# Patient Record
Sex: FEMALE | Race: WHITE | NOT HISPANIC OR LATINO | Employment: OTHER | URBAN - METROPOLITAN AREA
[De-identification: names, ages, dates, MRNs, and addresses within clinical notes are randomized per-mention and may not be internally consistent; named-entity substitution may affect disease eponyms.]

---

## 2021-10-11 ENCOUNTER — OFFICE VISIT (OUTPATIENT)
Dept: NEUROSURGERY | Facility: CLINIC | Age: 66
End: 2021-10-11

## 2021-10-11 VITALS
SYSTOLIC BLOOD PRESSURE: 118 MMHG | HEIGHT: 65 IN | WEIGHT: 174.2 LBS | TEMPERATURE: 96.8 F | BODY MASS INDEX: 29.02 KG/M2 | DIASTOLIC BLOOD PRESSURE: 50 MMHG

## 2021-10-11 DIAGNOSIS — M54.50 CHRONIC BILATERAL LOW BACK PAIN, UNSPECIFIED WHETHER SCIATICA PRESENT: ICD-10-CM

## 2021-10-11 DIAGNOSIS — M19.90 ARTHRITIS: ICD-10-CM

## 2021-10-11 DIAGNOSIS — M25.552 BILATERAL HIP PAIN: ICD-10-CM

## 2021-10-11 DIAGNOSIS — M51.36 DDD (DEGENERATIVE DISC DISEASE), LUMBAR: Primary | ICD-10-CM

## 2021-10-11 DIAGNOSIS — G89.29 CHRONIC BILATERAL LOW BACK PAIN, UNSPECIFIED WHETHER SCIATICA PRESENT: ICD-10-CM

## 2021-10-11 DIAGNOSIS — M25.551 BILATERAL HIP PAIN: ICD-10-CM

## 2021-10-11 PROCEDURE — 99203 OFFICE O/P NEW LOW 30 MIN: CPT | Performed by: PHYSICIAN ASSISTANT

## 2021-10-11 RX ORDER — IRBESARTAN AND HYDROCHLOROTHIAZIDE 300; 12.5 MG/1; MG/1
TABLET, FILM COATED ORAL
COMMUNITY
Start: 2021-10-08 | End: 2023-03-14

## 2021-10-11 RX ORDER — NIFEDIPINE 60 MG/1
TABLET, FILM COATED, EXTENDED RELEASE ORAL
COMMUNITY
Start: 2021-08-13 | End: 2023-03-14

## 2021-10-11 RX ORDER — BLOOD SUGAR DIAGNOSTIC
STRIP MISCELLANEOUS
COMMUNITY
Start: 2021-08-06

## 2021-10-11 RX ORDER — ALPRAZOLAM 0.5 MG/1
TABLET ORAL
COMMUNITY
Start: 2021-10-09 | End: 2023-03-14

## 2021-10-11 RX ORDER — OMEPRAZOLE 20 MG/1
20 CAPSULE, DELAYED RELEASE ORAL DAILY
COMMUNITY
End: 2023-03-14

## 2021-10-11 RX ORDER — TIZANIDINE 4 MG/1
TABLET ORAL
COMMUNITY
Start: 2021-10-07 | End: 2023-03-14

## 2021-10-11 RX ORDER — BISOPROLOL FUMARATE 10 MG/1
TABLET, FILM COATED ORAL
COMMUNITY
Start: 2021-10-07

## 2021-10-11 RX ORDER — OXYCODONE AND ACETAMINOPHEN 10; 325 MG/1; MG/1
TABLET ORAL
COMMUNITY
Start: 2021-09-18

## 2021-10-11 RX ORDER — GABAPENTIN 400 MG/1
CAPSULE ORAL
COMMUNITY
Start: 2021-09-18 | End: 2023-03-14

## 2021-10-11 RX ORDER — CHOLECALCIFEROL (VITAMIN D3) 125 MCG
CAPSULE ORAL
COMMUNITY

## 2021-10-11 RX ORDER — ALBUTEROL SULFATE 90 UG/1
AEROSOL, METERED RESPIRATORY (INHALATION)
COMMUNITY
Start: 2021-08-15

## 2021-10-11 RX ORDER — LAMOTRIGINE 100 MG/1
TABLET ORAL
COMMUNITY
Start: 2021-09-09

## 2021-10-11 RX ORDER — ATORVASTATIN CALCIUM 80 MG/1
80 TABLET, FILM COATED ORAL DAILY
COMMUNITY

## 2021-10-11 RX ORDER — GLIMEPIRIDE 4 MG/1
TABLET ORAL
COMMUNITY
Start: 2021-08-28

## 2021-10-11 RX ORDER — ESCITALOPRAM OXALATE 20 MG/1
20 TABLET ORAL DAILY
COMMUNITY

## 2021-10-11 NOTE — PROGRESS NOTES
Patient: Vibha Mcdowell  : 1955  Chart #: [id number]    Date of Service: 10/11/2021    CC:back pain, bilateral hip pain, bilateral leg pain    HPI  This is a 66-year-old female with comorbidities of tobacco abuse, 1 pack/day, diabetes mellitus, hypertension, thyroid disease, coronary artery disease, poorly controlled depression, asthma and chronic pain.  The patient is a patient of Dr. Leon, she had herniated disc at L5-S1 on the left on 2 separate surgeries and then had recurrence and underwent a lumbar fusion at L5-S1 on 2012.  The patient has had chronic pain in her back and some into both legs, she presents now stating that her pain in her back, hips and legs have gotten so severe she cannot do any housework, she is very limited on her activities throughout the day and she is depending upon her  to take care of her in the house.  Her pain is in the lower back radiating into the bilateral hips, down the back of the thighs into the calf and down into the side of each foot into the fourth and fifth digits.  Her pain is worse with sitting.  She denies bladder dysfunction.  She presents without new studies.    Chronic Illness:  Osteoarthritis  Chronic back pain  Nicotine dependence  Past Medical History:   Diagnosis Date   • Anemia    • Arthritis    • Asthma    • Bronchitis    • Diabetes mellitus (HCC)    • Headache    • Heart attack (HCC)    • Heart disease    • History of transfusion    • Hyperlipidemia    • Hypertension    • Low back pain    • Pneumonia    • Thyroid disease        Allergies   Allergen Reactions   • Coreg [Carvedilol] Shortness Of Breath   • Augmentin [Amoxicillin-Pot Clavulanate] Diarrhea   • Morphine Nausea And Vomiting   • Tequin [Gatifloxacin] Delirium         Current Outpatient Medications:   •  albuterol sulfate  (90 Base) MCG/ACT inhaler, , Disp: , Rfl:   •  ALPRAZolam (XANAX) 0.5 MG tablet, , Disp: , Rfl:   •  atorvastatin (LIPITOR) 80 MG tablet, Take 80  mg by mouth Daily., Disp: , Rfl:   •  bisoprolol (ZEBeta) 10 MG tablet, , Disp: , Rfl:   •  Cholecalciferol (Vitamin D3) 50 MCG (2000 UT) tablet, Take  by mouth., Disp: , Rfl:   •  escitalopram (LEXAPRO) 20 MG tablet, Take 20 mg by mouth Daily., Disp: , Rfl:   •  gabapentin (NEURONTIN) 400 MG capsule, , Disp: , Rfl:   •  glimepiride (AMARYL) 4 MG tablet, , Disp: , Rfl:   •  irbesartan-hydrochlorothiazide (AVALIDE) 300-12.5 MG tablet, , Disp: , Rfl:   •  lamoTRIgine (LaMICtal) 100 MG tablet, , Disp: , Rfl:   •  metFORMIN (GLUCOPHAGE) 1000 MG tablet, Take 1,000 mg by mouth., Disp: , Rfl:   •  NIFEdipine CC (ADALAT CC) 60 MG 24 hr tablet, , Disp: , Rfl:   •  omeprazole (priLOSEC) 20 MG capsule, Take 20 mg by mouth Daily., Disp: , Rfl:   •  OneTouch Verio test strip, , Disp: , Rfl:   •  oxyCODONE-acetaminophen (PERCOCET)  MG per tablet, , Disp: , Rfl:   •  tiZANidine (ZANAFLEX) 4 MG tablet, , Disp: , Rfl:     Social History     Socioeconomic History   • Marital status:    Tobacco Use   • Smoking status: Current Every Day Smoker     Packs/day: 1.00   • Smokeless tobacco: Never Used   Substance and Sexual Activity   • Alcohol use: Never   • Drug use: Never   • Sexual activity: Defer       Family History   Problem Relation Age of Onset   • Arthritis Mother    • Cancer Mother    • Heart disease Mother    • Hypertension Mother    • Arthritis Father    • Diabetes Sister    • Hypertension Sister        Review of Systems   Constitutional: Positive for activity change and fatigue. Negative for appetite change, chills, diaphoresis, fever and unexpected weight change.   HENT: Positive for congestion, dental problem, drooling, ear discharge, ear pain, postnasal drip, sinus pressure and trouble swallowing. Negative for facial swelling, hearing loss, mouth sores, nosebleeds, rhinorrhea, sinus pain, sneezing, sore throat, tinnitus and voice change.    Eyes: Positive for itching. Negative for photophobia, pain, discharge,  redness and visual disturbance.   Respiratory: Positive for wheezing. Negative for apnea, cough, choking, chest tightness, shortness of breath and stridor.    Cardiovascular: Positive for leg swelling. Negative for chest pain and palpitations.   Gastrointestinal: Positive for diarrhea. Negative for abdominal distention, abdominal pain, anal bleeding, blood in stool, constipation, nausea, rectal pain and vomiting.   Endocrine: Positive for cold intolerance and heat intolerance. Negative for polydipsia, polyphagia and polyuria.   Genitourinary: Negative for decreased urine volume, difficulty urinating, dysuria, enuresis, flank pain, frequency, genital sores, hematuria and urgency.   Musculoskeletal: Positive for back pain, neck pain and neck stiffness. Negative for arthralgias, gait problem, joint swelling and myalgias.   Skin: Negative for color change, pallor, rash and wound.   Allergic/Immunologic: Positive for environmental allergies. Negative for food allergies and immunocompromised state.   Neurological: Positive for headaches. Negative for dizziness, tremors, seizures, syncope, facial asymmetry, speech difficulty, weakness, light-headedness and numbness.   Hematological: Negative for adenopathy. Does not bruise/bleed easily.   Psychiatric/Behavioral: Positive for agitation, decreased concentration and sleep disturbance. Negative for behavioral problems, confusion, dysphoric mood, hallucinations, self-injury and suicidal ideas. The patient is not nervous/anxious and is not hyperactive.        Patient's Body mass index is 28.99 kg/m². indicating that she is overweight (BMI 25-29.9). Obesity-related health conditions include the following: osteoarthritis. Obesity is unchanged. BMI is is above average; BMI management plan is completed.      Social History    Tobacco Use      Smoking status: Current Every Day Smoker        Packs/day: 1.00      Smokeless tobacco: Never Used  [place] JANICE Mcdowell  reports that she has  "been smoking. She has been smoking about 1.00 pack per day. She has never used smokeless tobacco.. I have educated her on the risk of diseases from using tobacco products such as cancer, COPD, heart disease and osteoarthritis.   I advised her to quit and she is not willing to quit.  I spent 5 minutes counseling the patient.    Physical examination:  Blood pressure 118/50, temperature 96.8 °F (36 °C), height 165.1 cm (65\"), weight 79 kg (174 lb 3.2 oz).  HEENT- normocephalic, atraumatic, sclera clear  Lungs-normal expansion, no wheezing  Heart-regular rate and rhythm  Extremities- -edema     Neurologic Exam  [id number]  A/A/C, speech clear, attention normal, conversant, answers questions appropriately, good historian.  Cranial nerves II through XII are intact  Motor examination does not reveal weakness in the , upper or lower extremities.   Sensation is intact.  Gait is normal, balance is normal.   No tremors are noted.  Reflexes absent in LE's.  SLE causes back pain.   Palpation of the back is mildly tender.    Radiographic Imaging:  No images to review.    Medical Decision Making  Assessment and Plan:  1. Spinal stenosis  2. Osteoarthritis  3. Nicotine dependence-  I discussed smoking cessation with the patient. If she were to require surgery with fusion she must be nicotine free.    I have ordered the appropriate studies and she will return to see me.    Christy Coleman PA-C      Patient Care Team:  James Brooks MD as PCP - General (Internal Medicine)        "

## 2021-10-13 ENCOUNTER — PATIENT ROUNDING (BHMG ONLY) (OUTPATIENT)
Dept: NEUROSURGERY | Facility: CLINIC | Age: 66
End: 2021-10-13

## 2021-10-13 NOTE — PROGRESS NOTES
October 13, 2021    Hello, may I speak with Vibha Mcdowell?    My name is Renata    I am  with [facility]  [address]  [place] [address] [phone].    Before we get started may I verify your date of birth? 1955    I am calling to officially welcome you to our practice and ask about your recent visit. Is this a good time to talk? yes    Tell me about your visit with us. What things went well?  all went well       We're always looking for ways to make our patients' experiences even better. Do you have recommendations on ways we may improve? no    Overall were you satisfied with your first visit to our practice? yes       I appreciate you taking the time to speak with me today. Is there anything else I can do for you? No - I told Ms. Mcdowell if she any questions or concerns prior to her follow-up with Kristen Coleman PA-C on 11-11-21 to please call us.      Thank you, and have a great day.

## 2021-10-26 ENCOUNTER — TELEPHONE (OUTPATIENT)
Dept: NEUROSURGERY | Facility: CLINIC | Age: 66
End: 2021-10-26

## 2021-10-26 DIAGNOSIS — M19.90 ARTHRITIS: Primary | ICD-10-CM

## 2021-10-26 NOTE — TELEPHONE ENCOUNTER
[facility] called and needs lab orders for a Creatinine and BUN. Can someone please enter these orders to fax?    Fax # [phone] Attn: Marcela

## 2021-11-23 ENCOUNTER — OFFICE VISIT (OUTPATIENT)
Dept: NEUROSURGERY | Facility: CLINIC | Age: 66
End: 2021-11-23

## 2021-11-23 ENCOUNTER — HOSPITAL ENCOUNTER (OUTPATIENT)
Dept: GENERAL RADIOLOGY | Facility: HOSPITAL | Age: 66
Discharge: HOME | End: 2021-11-23
Admitting: PHYSICIAN ASSISTANT

## 2021-11-23 ENCOUNTER — TELEPHONE (OUTPATIENT)
Dept: NEUROSURGERY | Facility: CLINIC | Age: 66
End: 2021-11-23

## 2021-11-23 VITALS
SYSTOLIC BLOOD PRESSURE: 140 MMHG | HEIGHT: 65 IN | TEMPERATURE: 96.8 F | BODY MASS INDEX: 29.16 KG/M2 | DIASTOLIC BLOOD PRESSURE: 60 MMHG | WEIGHT: 175 LBS

## 2021-11-23 DIAGNOSIS — G89.29 CHRONIC BILATERAL LOW BACK PAIN WITH BILATERAL SCIATICA: ICD-10-CM

## 2021-11-23 DIAGNOSIS — G89.29 CHRONIC BILATERAL LOW BACK PAIN, UNSPECIFIED WHETHER SCIATICA PRESENT: Primary | ICD-10-CM

## 2021-11-23 DIAGNOSIS — M54.41 CHRONIC BILATERAL LOW BACK PAIN WITH BILATERAL SCIATICA: ICD-10-CM

## 2021-11-23 DIAGNOSIS — M25.551 BILATERAL HIP PAIN: ICD-10-CM

## 2021-11-23 DIAGNOSIS — M54.50 CHRONIC BILATERAL LOW BACK PAIN, UNSPECIFIED WHETHER SCIATICA PRESENT: ICD-10-CM

## 2021-11-23 DIAGNOSIS — M19.90 ARTHRITIS: Primary | ICD-10-CM

## 2021-11-23 DIAGNOSIS — M54.42 CHRONIC BILATERAL LOW BACK PAIN WITH BILATERAL SCIATICA: ICD-10-CM

## 2021-11-23 DIAGNOSIS — M25.552 BILATERAL HIP PAIN: ICD-10-CM

## 2021-11-23 DIAGNOSIS — G89.29 CHRONIC BILATERAL LOW BACK PAIN, UNSPECIFIED WHETHER SCIATICA PRESENT: ICD-10-CM

## 2021-11-23 DIAGNOSIS — M54.50 CHRONIC BILATERAL LOW BACK PAIN, UNSPECIFIED WHETHER SCIATICA PRESENT: Primary | ICD-10-CM

## 2021-11-23 DIAGNOSIS — M19.90 ARTHRITIS: ICD-10-CM

## 2021-11-23 PROCEDURE — 73522 X-RAY EXAM HIPS BI 3-4 VIEWS: CPT

## 2021-11-23 PROCEDURE — 72110 X-RAY EXAM L-2 SPINE 4/>VWS: CPT

## 2021-11-23 PROCEDURE — 99214 OFFICE O/P EST MOD 30 MIN: CPT | Performed by: PHYSICIAN ASSISTANT

## 2021-11-23 RX ORDER — BUDESONIDE AND FORMOTEROL FUMARATE DIHYDRATE 80; 4.5 UG/1; UG/1
AEROSOL RESPIRATORY (INHALATION)
COMMUNITY
End: 2021-11-23 | Stop reason: SDUPTHER

## 2021-11-23 RX ORDER — LAMOTRIGINE 100 MG/1
TABLET ORAL
COMMUNITY
End: 2021-11-23 | Stop reason: SDUPTHER

## 2021-11-23 RX ORDER — ESCITALOPRAM OXALATE 20 MG/1
TABLET ORAL EVERY 24 HOURS
COMMUNITY
End: 2021-11-23 | Stop reason: SDUPTHER

## 2021-11-23 RX ORDER — ALPRAZOLAM 0.5 MG/1
TABLET ORAL
COMMUNITY
Start: 2021-11-04

## 2021-11-23 RX ORDER — AMLODIPINE BESYLATE 10 MG/1
TABLET ORAL
COMMUNITY

## 2023-02-02 ENCOUNTER — TELEPHONE (OUTPATIENT)
Dept: NEUROSURGERY | Facility: CLINIC | Age: 68
End: 2023-02-02
Payer: MEDICARE

## 2023-02-02 DIAGNOSIS — G89.29 CHRONIC BILATERAL LOW BACK PAIN WITH BILATERAL SCIATICA: Primary | ICD-10-CM

## 2023-02-02 DIAGNOSIS — M54.42 CHRONIC BILATERAL LOW BACK PAIN WITH BILATERAL SCIATICA: Primary | ICD-10-CM

## 2023-02-02 DIAGNOSIS — M54.41 CHRONIC BILATERAL LOW BACK PAIN WITH BILATERAL SCIATICA: Primary | ICD-10-CM

## 2023-02-02 NOTE — TELEPHONE ENCOUNTER
Patient did not answer, left a VM. Unsure of what surgery that her  is referring to, patient hasn't been seen since November of 2021 and at that time the note indicated conservative treatment. She is also a smoker so a fusion would not be able to be done unless she stops smoking.

## 2023-02-02 NOTE — TELEPHONE ENCOUNTER
Provider:  Jared  Surgery/Procedure:   AMADOU  Surgery/Procedure Date:    Last visit:   11/23/21  Next visit: NA     Reason for call: Patient called in regards of a surgery auth that she was supposed to receive after her last appointment - she was last seen on 11/23/21 with Christy Coleman PA-C. There is no definite indication of surgery in the note, there is some mention that if she were to require lumbar fusion she would need to stop smoking, however patient reports she is still smoking a half a pack a day. The note did also indicate conservative treatment for now.    She is reporting worsening of symptoms, low back pain that is radiating down both of her legs to her feet, describes this as a sharp and burning pain. Is negatively affecting her activity level, she is unable to walk without pain, only finds relief when lying down. The pain is also affecting her balance, is requiring a walker now to ambulate - does not report weakness. She has not had any physical therapy, does see a pain clinic, had epidural injections before but it has been some time ago. Is taking gabapentin 400mg TID, and Percocet 10mg TID.     Last imaging on file is a lumbar MRI on 11/23/21.

## 2023-03-14 ENCOUNTER — HOSPITAL ENCOUNTER (OUTPATIENT)
Dept: MRI IMAGING | Facility: HOSPITAL | Age: 68
Discharge: HOME | End: 2023-03-14
Payer: MEDICARE

## 2023-03-14 ENCOUNTER — HOSPITAL ENCOUNTER (OUTPATIENT)
Dept: GENERAL RADIOLOGY | Facility: HOSPITAL | Age: 68
Discharge: HOME | End: 2023-03-14
Payer: MEDICARE

## 2023-03-14 ENCOUNTER — OFFICE VISIT (OUTPATIENT)
Dept: NEUROSURGERY | Facility: CLINIC | Age: 68
End: 2023-03-14
Payer: MEDICARE

## 2023-03-14 VITALS
HEIGHT: 65 IN | SYSTOLIC BLOOD PRESSURE: 160 MMHG | WEIGHT: 154 LBS | BODY MASS INDEX: 25.66 KG/M2 | DIASTOLIC BLOOD PRESSURE: 70 MMHG

## 2023-03-14 DIAGNOSIS — M54.41 CHRONIC BILATERAL LOW BACK PAIN WITH BILATERAL SCIATICA: Primary | ICD-10-CM

## 2023-03-14 DIAGNOSIS — M54.42 CHRONIC BILATERAL LOW BACK PAIN WITH BILATERAL SCIATICA: ICD-10-CM

## 2023-03-14 DIAGNOSIS — M54.41 CHRONIC BILATERAL LOW BACK PAIN WITH BILATERAL SCIATICA: ICD-10-CM

## 2023-03-14 DIAGNOSIS — G89.29 CHRONIC BILATERAL LOW BACK PAIN WITH BILATERAL SCIATICA: ICD-10-CM

## 2023-03-14 DIAGNOSIS — M51.36 DDD (DEGENERATIVE DISC DISEASE), LUMBAR: ICD-10-CM

## 2023-03-14 DIAGNOSIS — M54.42 CHRONIC BILATERAL LOW BACK PAIN WITH BILATERAL SCIATICA: Primary | ICD-10-CM

## 2023-03-14 DIAGNOSIS — G89.29 CHRONIC BILATERAL LOW BACK PAIN WITH BILATERAL SCIATICA: Primary | ICD-10-CM

## 2023-03-14 PROCEDURE — 72100 X-RAY EXAM L-S SPINE 2/3 VWS: CPT

## 2023-03-14 PROCEDURE — 1160F RVW MEDS BY RX/DR IN RCRD: CPT | Performed by: PHYSICIAN ASSISTANT

## 2023-03-14 PROCEDURE — 72148 MRI LUMBAR SPINE W/O DYE: CPT

## 2023-03-14 PROCEDURE — 99214 OFFICE O/P EST MOD 30 MIN: CPT | Performed by: PHYSICIAN ASSISTANT

## 2023-03-14 PROCEDURE — 1159F MED LIST DOCD IN RCRD: CPT | Performed by: PHYSICIAN ASSISTANT

## 2023-03-14 RX ORDER — NIFEDIPINE 30 MG/1
TABLET, EXTENDED RELEASE ORAL
COMMUNITY
Start: 2023-03-13

## 2023-03-14 RX ORDER — VALSARTAN AND HYDROCHLOROTHIAZIDE 320; 25 MG/1; MG/1
1 TABLET, FILM COATED ORAL DAILY
COMMUNITY
Start: 2022-09-24

## 2023-03-14 RX ORDER — OMEPRAZOLE 20 MG/1
20 CAPSULE, DELAYED RELEASE ORAL DAILY
COMMUNITY

## 2023-03-14 NOTE — PROGRESS NOTES
Vibha Mcdowell   1955   [id number]       03/14/2023     Chief Complaint   Patient presents with   • Back Pain       HPI   This is a 57-year-old female with a long history of low back pain.  She was last seen in the office in December 2021.  Since her last visit she has stopped smoking.  She presents now with increased low back pain pain radiating into both hips and down both legs to the calf with numbness into the lateral aspect of the left foot.  Her back and leg pain limits her activities, she reports she is not doing any housework due to pain.  She cannot walk distances without increased back and leg pain, she is not going to the grocery store any longer.  She is leading a very sedentary lifestyle.  The patient has comorbidities of diabetes, hypertension, thyroid disease, coronary artery disease, depression, asthma and chronic pain.  She had previous discectomy x2 then followed by a lumbar fusion at L5-S1 in 2012.   We have a new MRI to review.     Chronic Illnesses:  Chronic LBP  Past Medical History:  No date: Anemia  No date: Arthritis  No date: Asthma  No date: Bronchitis  No date: Diabetes mellitus (HCC)  No date: Headache  No date: Heart attack (HCC)  No date: Heart disease  No date: History of transfusion  No date: Hyperlipidemia  No date: Hypertension  No date: Low back pain  No date: Pneumonia  No date: Thyroid disease     Past Surgical History:   Procedure Laterality Date   • BACK SURGERY     • HYSTERECTOMY  1985   • THYROIDECTOMY          Allergies   Allergen Reactions   • Carvedilol Shortness Of Breath     cough   • Augmentin [Amoxicillin-Pot Clavulanate] Diarrhea   • Gatifloxacin Delirium and Nausea And Vomiting   • Morphine Nausea And Vomiting          Current Outpatient Medications:   •  albuterol sulfate  (90 Base) MCG/ACT inhaler, , Disp: , Rfl:   •  ALPRAZolam (XANAX) 0.5 MG tablet, 1 tab(s), Disp: , Rfl:   •  amLODIPine (NORVASC) 10 MG tablet, amlodipine 10 mg tablet, Disp: ,  Rfl:   •  atorvastatin (LIPITOR) 80 MG tablet, Take 1 tablet by mouth Daily., Disp: , Rfl:   •  bisoprolol (ZEBeta) 10 MG tablet, , Disp: , Rfl:   •  Cholecalciferol (Vitamin D3) 50 MCG (2000 UT) tablet, Take  by mouth., Disp: , Rfl:   •  escitalopram (LEXAPRO) 20 MG tablet, Take 1 tablet by mouth Daily., Disp: , Rfl:   •  glimepiride (AMARYL) 4 MG tablet, , Disp: , Rfl:   •  lamoTRIgine (LaMICtal) 100 MG tablet, , Disp: , Rfl:   •  NIFEdipine XL (PROCARDIA XL) 30 MG 24 hr tablet, , Disp: , Rfl:   •  omeprazole (priLOSEC) 20 MG capsule, Take 1 capsule by mouth Daily., Disp: , Rfl:   •  OneTouch Verio test strip, , Disp: , Rfl:   •  oxyCODONE-acetaminophen (PERCOCET)  MG per tablet, , Disp: , Rfl:   •  valsartan-hydrochlorothiazide (DIOVAN-HCT) 320-25 MG per tablet, Take 1 tablet by mouth Daily., Disp: , Rfl:      Social History     Socioeconomic History   • Marital status:    Tobacco Use   • Smoking status: Former     Packs/day: 1.00     Types: Cigarettes   • Smokeless tobacco: Never   Substance and Sexual Activity   • Alcohol use: Never   • Drug use: Never   • Sexual activity: Defer        family history includes Arthritis in her father and mother; Cancer in her mother; Diabetes in her sister; Heart disease in her mother; Hypertension in her mother and sister.     Social History    Tobacco Use      Smoking status: Former        Packs/day: 1.00        Types: Cigarettes      Smokeless tobacco: Never       Gait & Balance Assessment :  Risk assessment for falls. Fall precautions.  universal fall precautions, such as;   • Using gait aids a cane, walker at the appropriate height at all times for ambulation or if necessary a wheelchair  • Removing all area rugs and coffee tables to create a safe environment at home  • Ensure clean, dry floors  • Wearing supportive footwear and properly fitting clothing  • Ensure bed/chair is appropriate height and patient's feet can touch the floor  • Using a shower transfer  "bench  • Using walk-in shower and having shower safety bars installed  • Ensure proper lighting, minimize glare  • Have nightlights operational and in use  • Participation in an exercise program for gait training, balance training and strength  • Avoid carrying laundry up and down steps  • Ensure proper compliance and organization of medications to avoid errors   • Avoid use of over the counter sedatives and alcohol consumption  • Ensure easy access to call bell, glasses, TV control, telephone  • Ensure glasses/hearing aids are in use or close by (on top of night table)     Body mass index is 25.63 kg/m².   BMI is >= 25 and <30. (Overweight) The following options were offered after discussion;: referral to primary care       /70 (BP Location: Left arm, Patient Position: Sitting, Cuff Size: Adult)   Ht 165.1 cm (65\")   Wt 69.9 kg (154 lb)   BMI 25.63 kg/m²    Physical Examination:  HEENT-wnl  Lungs-No wheezing or SOB      Neurologic Exam   Patient is alert and oriented.  Pupils are equal and reactive.  Visual fields are full.  EOMI without nystagmus.    Gait is small steady steps.  Reflexes intact.  No focal weakness  To direct testing in the legs, dorsiflexion, extension intact.  SLR causes back and leg pain, Hip rotation causes discomfort. Palpation of the hips are tender.    Radiological Data Review:  For my review today is a lumbar MRI.    MRI of the lumbar spine is reviewed which shows prior surgical changes at L5-S1.  There is multilevel degenerative disc disease throughout the lumbar spine.  There is facet arthropathy at L3-4 and bilateral facet arthropathy associated with bilateral foraminal narrowing at L4-5.    Assessment and Plan:  Diagnoses and all orders for this visit:    1. Chronic bilateral low back pain with bilateral sciatica (Primary)  -     Cancel: XR spine cervical ap and lat w flex and ext; Future  -     EMG & Nerve Conduction Test; Future  -     XR Spine Lumbar AP & Lateral With Flex & " Ext; Future    2. DDD (degenerative disc disease), lumbar  -     Cancel: XR spine cervical ap and lat w flex and ext; Future  -     EMG & Nerve Conduction Test; Future  -     XR Spine Lumbar AP & Lateral With Flex & Ext; Future           MARCO Kelly      PCP:  James Brooks MD

## 2023-03-31 DIAGNOSIS — M51.36 DDD (DEGENERATIVE DISC DISEASE), LUMBAR: Primary | ICD-10-CM

## 2023-03-31 DIAGNOSIS — M81.0 AGE-RELATED OSTEOPOROSIS WITHOUT CURRENT PATHOLOGICAL FRACTURE: ICD-10-CM

## 2023-03-31 DIAGNOSIS — G89.29 CHRONIC BILATERAL LOW BACK PAIN WITH BILATERAL SCIATICA: ICD-10-CM

## 2023-03-31 DIAGNOSIS — M54.42 CHRONIC BILATERAL LOW BACK PAIN WITH BILATERAL SCIATICA: ICD-10-CM

## 2023-03-31 DIAGNOSIS — M54.41 CHRONIC BILATERAL LOW BACK PAIN WITH BILATERAL SCIATICA: ICD-10-CM

## 2023-05-18 ENCOUNTER — OFFICE VISIT (OUTPATIENT)
Dept: NEUROSURGERY | Facility: CLINIC | Age: 68
End: 2023-05-18
Payer: MEDICARE

## 2023-05-18 ENCOUNTER — HOSPITAL ENCOUNTER (OUTPATIENT)
Dept: GENERAL RADIOLOGY | Facility: HOSPITAL | Age: 68
Discharge: HOME | End: 2023-05-18
Payer: MEDICARE

## 2023-05-18 ENCOUNTER — HOSPITAL ENCOUNTER (OUTPATIENT)
Dept: NEUROLOGY | Facility: HOSPITAL | Age: 68
Discharge: HOME | End: 2023-05-18
Payer: MEDICARE

## 2023-05-18 VITALS
HEIGHT: 65 IN | WEIGHT: 150 LBS | BODY MASS INDEX: 24.99 KG/M2 | DIASTOLIC BLOOD PRESSURE: 66 MMHG | SYSTOLIC BLOOD PRESSURE: 145 MMHG

## 2023-05-18 DIAGNOSIS — M51.36 DDD (DEGENERATIVE DISC DISEASE), LUMBAR: ICD-10-CM

## 2023-05-18 DIAGNOSIS — M54.41 CHRONIC BILATERAL LOW BACK PAIN WITH BILATERAL SCIATICA: ICD-10-CM

## 2023-05-18 DIAGNOSIS — M54.42 CHRONIC BILATERAL LOW BACK PAIN WITH BILATERAL SCIATICA: ICD-10-CM

## 2023-05-18 DIAGNOSIS — G89.29 CHRONIC BILATERAL LOW BACK PAIN WITH BILATERAL SCIATICA: ICD-10-CM

## 2023-05-18 DIAGNOSIS — M54.42 CHRONIC BILATERAL LOW BACK PAIN WITH BILATERAL SCIATICA: Primary | ICD-10-CM

## 2023-05-18 DIAGNOSIS — G89.29 CHRONIC BILATERAL LOW BACK PAIN WITH BILATERAL SCIATICA: Primary | ICD-10-CM

## 2023-05-18 DIAGNOSIS — M54.41 CHRONIC BILATERAL LOW BACK PAIN WITH BILATERAL SCIATICA: Primary | ICD-10-CM

## 2023-05-18 DIAGNOSIS — M19.90 ARTHRITIS: ICD-10-CM

## 2023-05-18 DIAGNOSIS — M47.816 SPONDYLOSIS OF LUMBAR SPINE: ICD-10-CM

## 2023-05-18 PROCEDURE — 95910 NRV CNDJ TEST 7-8 STUDIES: CPT

## 2023-05-18 PROCEDURE — 72110 X-RAY EXAM L-2 SPINE 4/>VWS: CPT

## 2023-05-18 PROCEDURE — 95886 MUSC TEST DONE W/N TEST COMP: CPT

## 2023-05-18 RX ORDER — EMPAGLIFLOZIN 10 MG/1
TABLET, FILM COATED ORAL
COMMUNITY
Start: 2023-04-28

## 2023-05-18 RX ORDER — GABAPENTIN 400 MG/1
CAPSULE ORAL
COMMUNITY
Start: 2023-05-16

## 2023-05-18 NOTE — LETTER
May 28, 2023     James Brooks MD  [address] [place] [address]    Patient: Vibha Mcdowell   YOB: 1955   Date of Visit: 5/18/2023     Dear Dr. Cecilia MD:    Thank you for referring Vibha Mcdowell to me for evaluation. Below are the relevant portions of my assessment and plan of care.    If you have questions, please do not hesitate to call me. I look forward to following Vibha along with you.         Sincerely,        Christy Coleman PA-C        CC: No Recipients      Progress Notes:  Vibha Mcdowell   1955   [id number]       05/18/2023     Chief Complaint   Patient presents with   • Chronic bilateral low back pain with bilateral sciatica        HPI   This 67 yo female has chronic LBP that radiates into both hips to the lower extremities and to the lateral left foot. Her mri revealed bilateral facet arthropathy at L3-4 and bilateral foraminal narrowing at L4-5. She is her with nerve studies and xrays.    Chronic Illnesses:  osteoarthritis  Past Medical History:  No date: Anemia  No date: Arthritis  No date: Asthma  No date: Bronchitis  No date: Diabetes mellitus  No date: Headache  No date: Heart attack  No date: Heart disease  No date: History of transfusion  No date: Hyperlipidemia  No date: Hypertension  No date: Low back pain  No date: Pneumonia  No date: Thyroid disease     Past Surgical History:   Procedure Laterality Date   • BACK SURGERY     • HYSTERECTOMY  1985   • THYROIDECTOMY          Allergies   Allergen Reactions   • Carvedilol Shortness Of Breath     cough   • Augmentin [Amoxicillin-Pot Clavulanate] Diarrhea   • Gatifloxacin Delirium and Nausea And Vomiting   • Morphine Nausea And Vomiting          Current Outpatient Medications:   •  albuterol sulfate  (90 Base) MCG/ACT inhaler, , Disp: , Rfl:   •  ALPRAZolam (XANAX) 0.5 MG tablet, 1 tab(s), Disp: , Rfl:   •  amLODIPine (NORVASC) 10 MG tablet, amlodipine 10 mg tablet, Disp: , Rfl:   •   atorvastatin (LIPITOR) 80 MG tablet, Take 1 tablet by mouth Daily., Disp: , Rfl:   •  bisoprolol (ZEBeta) 10 MG tablet, , Disp: , Rfl:   •  Cholecalciferol (Vitamin D3) 50 MCG (2000 UT) tablet, Take  by mouth., Disp: , Rfl:   •  escitalopram (LEXAPRO) 20 MG tablet, Take 1 tablet by mouth Daily., Disp: , Rfl:   •  gabapentin (NEURONTIN) 400 MG capsule, , Disp: , Rfl:   •  glimepiride (AMARYL) 4 MG tablet, , Disp: , Rfl:   •  Jardiance 10 MG tablet tablet, , Disp: , Rfl:   •  lamoTRIgine (LaMICtal) 100 MG tablet, , Disp: , Rfl:   •  NIFEdipine XL (PROCARDIA XL) 30 MG 24 hr tablet, , Disp: , Rfl:   •  omeprazole (priLOSEC) 20 MG capsule, Take 1 capsule by mouth Daily., Disp: , Rfl:   •  OneTouch Verio test strip, , Disp: , Rfl:   •  oxyCODONE-acetaminophen (PERCOCET)  MG per tablet, , Disp: , Rfl:   •  valsartan-hydrochlorothiazide (DIOVAN-HCT) 320-25 MG per tablet, Take 1 tablet by mouth Daily., Disp: , Rfl:      Social History     Socioeconomic History   • Marital status:    Tobacco Use   • Smoking status: Former     Packs/day: 1.00     Types: Cigarettes   • Smokeless tobacco: Never   Substance and Sexual Activity   • Alcohol use: Never   • Drug use: Never   • Sexual activity: Defer        family history includes Arthritis in her father and mother; Cancer in her mother; Diabetes in her sister; Heart disease in her mother; Hypertension in her mother and sister.     Social History    Tobacco Use      Smoking status: Former        Packs/day: 1.00        Types: Cigarettes      Smokeless tobacco: Never       Gait & Balance Assessment :  Risk assessment for falls. Fall precautions.  universal fall precautions, such as;   • Using gait aids a cane, walker at the appropriate height at all times for ambulation or if necessary a wheelchair  • Removing all area rugs and coffee tables to create a safe environment at home  • Ensure clean, dry floors  • Wearing supportive footwear and properly fitting  "clothing  • Ensure bed/chair is appropriate height and patient's feet can touch the floor  • Using a shower transfer bench  • Using walk-in shower and having shower safety bars installed  • Ensure proper lighting, minimize glare  • Have nightlights operational and in use  • Participation in an exercise program for gait training, balance training and strength  • Avoid carrying laundry up and down steps  • Ensure proper compliance and organization of medications to avoid errors   • Avoid use of over the counter sedatives and alcohol consumption  • Ensure easy access to call bell, glasses, TV control, telephone  • Ensure glasses/hearing aids are in use or close by (on top of night table)     Body mass index is 24.96 kg/m².   BMI is within normal parameters. No other follow-up for BMI required.       /66 (BP Location: Left arm, Patient Position: Sitting, Cuff Size: Adult)   Ht 165.1 cm (65\")   Wt 68 kg (150 lb)   BMI 24.96 kg/m²    Physical Examination:  HEENT-wnl  Lungs-No wheezing or SOB      Neurologic Exam   Patient is alert and oriented.  Pupils are equal and reactive.  Visual fields are full.  EOMI without nystagmus.    Gait steady  No focal weakness in LE's.  SLR causes back and leg pain    Radiological Data Review:  I  Have again reviewed the diagnostic studies and new nerve studies.      Assessment and Plan:  Diagnoses and all orders for this visit:    1. Chronic bilateral low back pain with bilateral sciatica (Primary)  -     Ambulatory Referral to Physical Therapy Evaluate and treat    2. Arthritis  -     Ambulatory Referral to Physical Therapy Evaluate and treat    3. DDD (degenerative disc disease), lumbar    4. Spondylosis of lumbar spine    PT and telemed visit in 6 weeks.     I spent 30minutes caring for Vibha Mcdowell on 05/28/23This time includes activities preparing for the visit, reviewing test, reviewing history and performing an appropriate examination.  Discussing with the patient " and reviewing and independently interpreting the diagnostic studies, communicating that information to the patient along with discussing care coordination and referrals if needed and documenting information in the medical records.    Any copied data from previous notes included in the (1) HPI, (2) PE, (3) MDM and/or assessment and plan has been reviewed and is accurate as of this date.      Christy Coleman, PAC      PCP:  James Brooks MD

## 2023-05-18 NOTE — PROGRESS NOTES
Vibha Mcdowell   1955   [id number]       05/18/2023     Chief Complaint   Patient presents with   • Chronic bilateral low back pain with bilateral sciatica        HPI   This 69 yo female has chronic LBP that radiates into both hips to the lower extremities and to the lateral left foot. Her mri revealed bilateral facet arthropathy at L3-4 and bilateral foraminal narrowing at L4-5. She is her with nerve studies and xrays.    Chronic Illnesses:  osteoarthritis  Past Medical History:  No date: Anemia  No date: Arthritis  No date: Asthma  No date: Bronchitis  No date: Diabetes mellitus  No date: Headache  No date: Heart attack  No date: Heart disease  No date: History of transfusion  No date: Hyperlipidemia  No date: Hypertension  No date: Low back pain  No date: Pneumonia  No date: Thyroid disease     Past Surgical History:   Procedure Laterality Date   • BACK SURGERY     • HYSTERECTOMY  1985   • THYROIDECTOMY          Allergies   Allergen Reactions   • Carvedilol Shortness Of Breath     cough   • Augmentin [Amoxicillin-Pot Clavulanate] Diarrhea   • Gatifloxacin Delirium and Nausea And Vomiting   • Morphine Nausea And Vomiting          Current Outpatient Medications:   •  albuterol sulfate  (90 Base) MCG/ACT inhaler, , Disp: , Rfl:   •  ALPRAZolam (XANAX) 0.5 MG tablet, 1 tab(s), Disp: , Rfl:   •  amLODIPine (NORVASC) 10 MG tablet, amlodipine 10 mg tablet, Disp: , Rfl:   •  atorvastatin (LIPITOR) 80 MG tablet, Take 1 tablet by mouth Daily., Disp: , Rfl:   •  bisoprolol (ZEBeta) 10 MG tablet, , Disp: , Rfl:   •  Cholecalciferol (Vitamin D3) 50 MCG (2000 UT) tablet, Take  by mouth., Disp: , Rfl:   •  escitalopram (LEXAPRO) 20 MG tablet, Take 1 tablet by mouth Daily., Disp: , Rfl:   •  gabapentin (NEURONTIN) 400 MG capsule, , Disp: , Rfl:   •  glimepiride (AMARYL) 4 MG tablet, , Disp: , Rfl:   •  Jardiance 10 MG tablet tablet, , Disp: , Rfl:   •  lamoTRIgine (LaMICtal) 100 MG tablet, , Disp: , Rfl:   •   NIFEdipine XL (PROCARDIA XL) 30 MG 24 hr tablet, , Disp: , Rfl:   •  omeprazole (priLOSEC) 20 MG capsule, Take 1 capsule by mouth Daily., Disp: , Rfl:   •  OneTouch Verio test strip, , Disp: , Rfl:   •  oxyCODONE-acetaminophen (PERCOCET)  MG per tablet, , Disp: , Rfl:   •  valsartan-hydrochlorothiazide (DIOVAN-HCT) 320-25 MG per tablet, Take 1 tablet by mouth Daily., Disp: , Rfl:      Social History     Socioeconomic History   • Marital status:    Tobacco Use   • Smoking status: Former     Packs/day: 1.00     Types: Cigarettes   • Smokeless tobacco: Never   Substance and Sexual Activity   • Alcohol use: Never   • Drug use: Never   • Sexual activity: Defer        family history includes Arthritis in her father and mother; Cancer in her mother; Diabetes in her sister; Heart disease in her mother; Hypertension in her mother and sister.     Social History    Tobacco Use      Smoking status: Former        Packs/day: 1.00        Types: Cigarettes      Smokeless tobacco: Never       Gait & Balance Assessment :  Risk assessment for falls. Fall precautions.  universal fall precautions, such as;   • Using gait aids a cane, walker at the appropriate height at all times for ambulation or if necessary a wheelchair  • Removing all area rugs and coffee tables to create a safe environment at home  • Ensure clean, dry floors  • Wearing supportive footwear and properly fitting clothing  • Ensure bed/chair is appropriate height and patient's feet can touch the floor  • Using a shower transfer bench  • Using walk-in shower and having shower safety bars installed  • Ensure proper lighting, minimize glare  • Have nightlights operational and in use  • Participation in an exercise program for gait training, balance training and strength  • Avoid carrying laundry up and down steps  • Ensure proper compliance and organization of medications to avoid errors   • Avoid use of over the counter sedatives and alcohol  "consumption  • Ensure easy access to call bell, glasses, TV control, telephone  • Ensure glasses/hearing aids are in use or close by (on top of night table)     Body mass index is 24.96 kg/m².   BMI is within normal parameters. No other follow-up for BMI required.       /66 (BP Location: Left arm, Patient Position: Sitting, Cuff Size: Adult)   Ht 165.1 cm (65\")   Wt 68 kg (150 lb)   BMI 24.96 kg/m²    Physical Examination:  HEENT-wnl  Lungs-No wheezing or SOB      Neurologic Exam   Patient is alert and oriented.  Pupils are equal and reactive.  Visual fields are full.  EOMI without nystagmus.    Gait steady  No focal weakness in LE's.  SLR causes back and leg pain    Radiological Data Review:  I  Have again reviewed the diagnostic studies and new nerve studies.      Assessment and Plan:  Diagnoses and all orders for this visit:    1. Chronic bilateral low back pain with bilateral sciatica (Primary)  -     Ambulatory Referral to Physical Therapy Evaluate and treat    2. Arthritis  -     Ambulatory Referral to Physical Therapy Evaluate and treat    3. DDD (degenerative disc disease), lumbar    4. Spondylosis of lumbar spine    PT and telemed visit in 6 weeks.     I spent 30minutes caring for Vibha Mcdowell on 05/28/23This time includes activities preparing for the visit, reviewing test, reviewing history and performing an appropriate examination.  Discussing with the patient and reviewing and independently interpreting the diagnostic studies, communicating that information to the patient along with discussing care coordination and referrals if needed and documenting information in the medical records.    Any copied data from previous notes included in the (1) HPI, (2) PE, (3) MDM and/or assessment and plan has been reviewed and is accurate as of this date.      Christy Coleman, PAC      PCP:  James Brooks MD   "

## 2023-05-28 PROBLEM — M47.816 SPONDYLOSIS OF LUMBAR SPINE: Status: ACTIVE | Noted: 2023-05-28

## 2023-05-28 PROBLEM — M51.36 DDD (DEGENERATIVE DISC DISEASE), LUMBAR: Status: ACTIVE | Noted: 2023-05-28

## 2023-07-07 PROBLEM — M54.17 LUMBOSACRAL RADICULOPATHY AT L5: Status: ACTIVE | Noted: 2023-07-07

## 2023-07-07 PROBLEM — Z98.890 HISTORY OF BACK SURGERY: Status: ACTIVE | Noted: 2023-07-07

## 2023-07-07 NOTE — H&P (VIEW-ONLY)
Vibha Mcdowell   1955   [id number]       07/07/2023     Chief Complaint   Patient presents with    discuss surgery    Back Pain    Leg Pain     BLE        HPI   This is a 68-year-old female that has been followed in this office since October 2021 with low back pain that radiates to the left hip and down the left leg to the outside of the left foot.  Sometimes she has pain that goes to the other side in the right hip and right leg but it is mostly in the left lower back buttocks and left leg.  She presents today after having an exacerbation of her back and left leg pain.  She presents with a bone density study which shows osteopenia.  She previously had surgery by Dr. Liriano in 2012 with 2 lumbar discectomies and then the third surgery being a lumbar fusion at L5-S1. She see's Pain Management on [facility] and had injections some 5 years ago, she reports that they didn't help.     Chronic Illnesses:  Chronic low back pain  Physical deconditioning  Past Medical History:  No date: Anemia  No date: Arthritis  No date: Asthma  No date: Bronchitis  No date: Diabetes mellitus  No date: Headache  No date: Heart attack  No date: Heart disease  No date: History of transfusion  No date: Hyperlipidemia  No date: Hypertension  No date: Low back pain  No date: Pneumonia  No date: Thyroid disease     Past Surgical History:   Procedure Laterality Date    BACK SURGERY      HYSTERECTOMY  1985    THYROIDECTOMY          Allergies   Allergen Reactions    Carvedilol Shortness Of Breath     cough    Augmentin [Amoxicillin-Pot Clavulanate] Diarrhea    Gatifloxacin Delirium and Nausea And Vomiting    Morphine Nausea And Vomiting          Current Outpatient Medications:     acetaminophen-codeine (TYLENOL/CODEINE #3) 300-30 MG per tablet, Take 1 tablet by mouth 4 (Four) Times a Day As Needed for Moderate Pain or Severe Pain., Disp: 20 tablet, Rfl: 1    albuterol sulfate  (90 Base) MCG/ACT inhaler, , Disp: , Rfl:      ALPRAZolam (XANAX) 0.5 MG tablet, 1 tab(s), Disp: , Rfl:     amLODIPine (NORVASC) 10 MG tablet, amlodipine 10 mg tablet, Disp: , Rfl:     atorvastatin (LIPITOR) 80 MG tablet, Take 1 tablet by mouth Daily., Disp: , Rfl:     bisoprolol (ZEBeta) 10 MG tablet, , Disp: , Rfl:     Cholecalciferol (Vitamin D3) 50 MCG (2000 UT) tablet, Take  by mouth., Disp: , Rfl:     citalopram (CeleXA) 20 MG tablet, , Disp: , Rfl:     gabapentin (NEURONTIN) 400 MG capsule, , Disp: , Rfl:     Jardiance 10 MG tablet tablet, , Disp: , Rfl:     lamoTRIgine (LaMICtal) 100 MG tablet, , Disp: , Rfl:     NIFEdipine XL (PROCARDIA XL) 30 MG 24 hr tablet, , Disp: , Rfl:     omeprazole (priLOSEC) 20 MG capsule, Take 1 capsule by mouth Daily., Disp: , Rfl:     OneTouch Verio test strip, , Disp: , Rfl:     oxyCODONE-acetaminophen (PERCOCET)  MG per tablet, , Disp: , Rfl:     tiZANidine (ZANAFLEX) 4 MG tablet, Take 1 tablet by mouth 3 (Three) Times a Day., Disp: 60 tablet, Rfl: 1    valsartan-hydrochlorothiazide (DIOVAN-HCT) 320-25 MG per tablet, Take 1 tablet by mouth Daily., Disp: , Rfl:     aspirin 81 MG EC tablet, Take 1 tablet by mouth Daily., Disp: , Rfl:      Social History     Socioeconomic History    Marital status:    Tobacco Use    Smoking status: Former     Packs/day: 1.00     Types: Cigarettes    Smokeless tobacco: Never   Substance and Sexual Activity    Alcohol use: Never    Drug use: Never    Sexual activity: Defer        family history includes Arthritis in her father and mother; Cancer in her mother; Diabetes in her sister; Heart disease in her mother; Hypertension in her mother and sister.     Social History    Tobacco Use      Smoking status: Former        Packs/day: 1.00        Types: Cigarettes      Smokeless tobacco: Never       Gait & Balance Assessment :  Risk assessment for falls. Fall precautions.  universal fall precautions, such as;   Using gait aids a cane, walker at the appropriate height at all times for  "ambulation or if necessary a wheelchair  Removing all area rugs and coffee tables to create a safe environment at home  Ensure clean, dry floors  Wearing supportive footwear and properly fitting clothing  Ensure bed/chair is appropriate height and patient's feet can touch the floor  Using a shower transfer bench  Using walk-in shower and having shower safety bars installed  Ensure proper lighting, minimize glare  Have nightlights operational and in use  Participation in an exercise program for gait training, balance training and strength  Avoid carrying laundry up and down steps  Ensure proper compliance and organization of medications to avoid errors   Avoid use of over the counter sedatives and alcohol consumption  Ensure easy access to call bell, glasses, TV control, telephone  Ensure glasses/hearing aids are in use or close by (on top of night table)     Body mass index is 24.3 kg/m².   BMI is within normal parameters. No other follow-up for BMI required.       /60 (BP Location: Right arm, Patient Position: Sitting, Cuff Size: Adult)   Temp 97.3 °F (36.3 °C) (Infrared)   Ht 165.1 cm (65\")   Wt 66.2 kg (146 lb)   BMI 24.30 kg/m²    Physical Examination:  HEENT-wnl  Lungs-No wheezing or SOB      Neurologic Exam   Patient is alert and oriented.  Pupils are equal and reactive.  Visual fields are full.  EOMI without nystagmus.    Gait is fairly steady and slow however she walks and a exaggerated flexed forward position Due to back and left leg pain.  Reflexes intact.  No focal weakness in the lower extremities, generalized weakness is noted.  SLR raising is positive on the left.    Radiological Data Review:  For my review todayIs a DEXA scan which shows osteopenia.  I have reviewed the prior MRI scan And x-rays.    Assessment and Plan:  Diagnoses and all orders for this visit:    1. Spondylosis of lumbar spine (Primary)  -     XR hip w or wo pelvis 2-3 view left; Future  -     Case Request Cath Lab: IR " myelogram lumbar with fluoroscopy    2. Chronic bilateral low back pain with bilateral sciatica  -     XR hip w or wo pelvis 2-3 view left; Future  -     Case Request Cath Lab: IR myelogram lumbar with fluoroscopy    3. Arthritis  -     XR hip w or wo pelvis 2-3 view left; Future  -     Case Request Cath Lab: IR myelogram lumbar with fluoroscopy    4. Pain of left hip  -     XR hip w or wo pelvis 2-3 view left; Future  -     Case Request Cath Lab: IR myelogram lumbar with fluoroscopy    5. DDD (degenerative disc disease), lumbar    6. Lumbosacral radiculopathy at L5    7. History of back surgery       This 68-year-old female presents after having a twisting injury while showering and having an excessive severe exacerbation of her back and left leg pain.  MRI of the lumbar spine is reviewed which shows mild stenosis at L4-5, flexion-extension views show probable instability at L4-5 above the level of her prior fusion at 5 1.  She has had a DEXA scan which shows osteopenia, she has stopped smoking, she has stable Hypertension, coronary artery disease, hyperlipidemia, she stopped smoking, she is being evaluated for asthma and is scheduled for an upcoming CT of the chest.  I have reviewed the diagnostic studies with Dr. Jacques and he has recommended that we proceed with a lumbar myelogram.  I have discussed this with the patient and her  and they wish to proceed.  I would go ahead and plan on asking pain management to do the injections as we discussed today.  She would best be served by an L5 nerve block to see if this improves her left leg pain.    I spent 30 minutes caring for Vibha Mcdowell on 07/07/23.  This time includes activities preparing for the visit, reviewing test, reviewing history and performing an appropriate examination.  Discussing with the patient and reviewing and independently interpreting the diagnostic studies, communicating that information to the patient along with discussing care  coordination and referrals if needed and documenting information in the medical records.    Any copied data from previous notes included in the (1) HPI, (2) PE, (3) MDM and/or assessment and plan has been reviewed and is accurate as of this date.  Christy Coleman, PAC      PCP:  James Brooks MD

## 2023-07-12 PROBLEM — M25.552 PAIN OF LEFT HIP: Status: ACTIVE | Noted: 2023-07-12

## 2023-07-19 ENCOUNTER — APPOINTMENT (OUTPATIENT)
Dept: CT IMAGING | Facility: HOSPITAL | Age: 68
End: 2023-07-19
Payer: MEDICARE

## 2023-07-19 ENCOUNTER — HOSPITAL ENCOUNTER (OUTPATIENT)
Facility: HOSPITAL | Age: 68
Setting detail: HOSPITAL OUTPATIENT SURGERY
Discharge: HOME | End: 2023-07-19
Attending: RADIOLOGY | Admitting: RADIOLOGY
Payer: MEDICARE

## 2023-07-19 VITALS
WEIGHT: 148.59 LBS | BODY MASS INDEX: 24.76 KG/M2 | SYSTOLIC BLOOD PRESSURE: 170 MMHG | RESPIRATION RATE: 16 BRPM | OXYGEN SATURATION: 93 % | HEART RATE: 64 BPM | DIASTOLIC BLOOD PRESSURE: 73 MMHG | TEMPERATURE: 97.2 F | HEIGHT: 65 IN

## 2023-07-19 DIAGNOSIS — M54.42 CHRONIC BILATERAL LOW BACK PAIN WITH BILATERAL SCIATICA: ICD-10-CM

## 2023-07-19 DIAGNOSIS — M25.552 PAIN OF LEFT HIP: ICD-10-CM

## 2023-07-19 DIAGNOSIS — M19.90 ARTHRITIS: ICD-10-CM

## 2023-07-19 DIAGNOSIS — M47.816 SPONDYLOSIS OF LUMBAR SPINE: ICD-10-CM

## 2023-07-19 DIAGNOSIS — M54.41 CHRONIC BILATERAL LOW BACK PAIN WITH BILATERAL SCIATICA: ICD-10-CM

## 2023-07-19 DIAGNOSIS — G89.29 CHRONIC BILATERAL LOW BACK PAIN WITH BILATERAL SCIATICA: ICD-10-CM

## 2023-07-19 PROCEDURE — 62304 MYELOGRAPHY LUMBAR INJECTION: CPT | Performed by: RADIOLOGY

## 2023-07-19 PROCEDURE — 72132 CT LUMBAR SPINE W/DYE: CPT

## 2023-07-19 PROCEDURE — 25510000001 IOPAMIDOL 41 % SOLUTION: Performed by: RADIOLOGY

## 2023-07-19 RX ORDER — LIDOCAINE HYDROCHLORIDE 10 MG/ML
INJECTION, SOLUTION EPIDURAL; INFILTRATION; INTRACAUDAL; PERINEURAL
Status: DISCONTINUED | OUTPATIENT
Start: 2023-07-19 | End: 2023-07-19 | Stop reason: HOSPADM

## 2023-07-27 ENCOUNTER — OFFICE VISIT (OUTPATIENT)
Dept: NEUROSURGERY | Facility: CLINIC | Age: 68
End: 2023-07-27
Payer: MEDICARE

## 2023-07-27 VITALS
WEIGHT: 147.6 LBS | SYSTOLIC BLOOD PRESSURE: 110 MMHG | BODY MASS INDEX: 24.59 KG/M2 | DIASTOLIC BLOOD PRESSURE: 58 MMHG | HEIGHT: 65 IN

## 2023-07-27 DIAGNOSIS — M54.42 CHRONIC BILATERAL LOW BACK PAIN WITH BILATERAL SCIATICA: ICD-10-CM

## 2023-07-27 DIAGNOSIS — Z98.890 HISTORY OF BACK SURGERY: ICD-10-CM

## 2023-07-27 DIAGNOSIS — M51.36 DDD (DEGENERATIVE DISC DISEASE), LUMBAR: ICD-10-CM

## 2023-07-27 DIAGNOSIS — M19.90 ARTHRITIS: ICD-10-CM

## 2023-07-27 DIAGNOSIS — G89.29 CHRONIC BILATERAL LOW BACK PAIN WITH BILATERAL SCIATICA: ICD-10-CM

## 2023-07-27 DIAGNOSIS — M54.41 CHRONIC BILATERAL LOW BACK PAIN WITH BILATERAL SCIATICA: ICD-10-CM

## 2023-07-27 DIAGNOSIS — M25.552 PAIN OF LEFT HIP: ICD-10-CM

## 2023-07-27 DIAGNOSIS — M47.816 SPONDYLOSIS OF LUMBAR SPINE: ICD-10-CM

## 2023-07-27 DIAGNOSIS — M54.17 LUMBOSACRAL RADICULOPATHY AT L5: Primary | ICD-10-CM

## 2023-07-27 PROCEDURE — 1159F MED LIST DOCD IN RCRD: CPT | Performed by: PHYSICIAN ASSISTANT

## 2023-07-27 PROCEDURE — 1160F RVW MEDS BY RX/DR IN RCRD: CPT | Performed by: PHYSICIAN ASSISTANT

## 2023-07-27 PROCEDURE — 99214 OFFICE O/P EST MOD 30 MIN: CPT | Performed by: PHYSICIAN ASSISTANT

## 2023-07-27 RX ORDER — TIZANIDINE HYDROCHLORIDE 6 MG/1
6 CAPSULE, GELATIN COATED ORAL 3 TIMES DAILY
Qty: 60 CAPSULE | Refills: 2 | Status: SHIPPED | OUTPATIENT
Start: 2023-07-27 | End: 2023-07-31

## 2023-07-31 ENCOUNTER — TELEPHONE (OUTPATIENT)
Dept: NEUROSURGERY | Facility: CLINIC | Age: 68
End: 2023-07-31
Payer: MEDICARE

## 2023-07-31 DIAGNOSIS — M54.41 CHRONIC BILATERAL LOW BACK PAIN WITH BILATERAL SCIATICA: Primary | ICD-10-CM

## 2023-07-31 DIAGNOSIS — M54.17 LUMBOSACRAL RADICULOPATHY AT L5: ICD-10-CM

## 2023-07-31 DIAGNOSIS — G89.29 CHRONIC BILATERAL LOW BACK PAIN WITH BILATERAL SCIATICA: Primary | ICD-10-CM

## 2023-07-31 DIAGNOSIS — M54.42 CHRONIC BILATERAL LOW BACK PAIN WITH BILATERAL SCIATICA: Primary | ICD-10-CM

## 2023-07-31 RX ORDER — TIZANIDINE 4 MG/1
6 TABLET ORAL 3 TIMES DAILY
Qty: 90 TABLET | Refills: 2 | Status: SHIPPED | OUTPATIENT
Start: 2023-07-31

## 2023-08-09 ENCOUNTER — TELEPHONE (OUTPATIENT)
Dept: NEUROSURGERY | Facility: CLINIC | Age: 68
End: 2023-08-09

## 2023-08-09 NOTE — TELEPHONE ENCOUNTER
[facility], SPOUSE OF THE PATIENT, CALLED STATING THAT THEY HAVE THE RELEASE FROM THAT PATIENTS CARDIOLOGIST, DR. ALCON BENÍTEZ, AND HER PULMONOLOGY DOCTOR, DR. Arcelia CHUA. DR. CHUA HAS FAXED OVER THE RELEASE FORM (THEY WOULDN'T PROVIDE THE PHYSICAL), AND THE PATIENT HAS A PHYSICAL RELEASE FORM FROM DR. PULIDO.    DR. ARCELIA CHUA # [phone]    DR. ALCON BENÍTEZ # [phone]    CALL [facility] @ [phone] IF YOU HAVE ANY QUESTIONS.

## 2023-08-10 ENCOUNTER — TELEPHONE (OUTPATIENT)
Dept: NEUROSURGERY | Facility: CLINIC | Age: 68
End: 2023-08-10
Payer: MEDICARE

## 2023-08-10 NOTE — TELEPHONE ENCOUNTER
Documentation Only: I have received a RECALL FAX FOR TIZANIDINE 4 MG   I have called the patient and advised her to stop taking this medication and to take it back to her pharmacy.  I have also told her that we would call her in something else per Christy Coleman.  She said ok and thanked me for the call back.    I have called the pharmacy and ask if the 2 MG would be covered, pharmacy said yes and they took a verbal order over the phone  Christy Coleman is aware of this. Patient will be taking 2 MG 3 times a day.    I have called and left a short VM letting the patient know.

## 2023-08-10 NOTE — TELEPHONE ENCOUNTER
Per Kristen's last note. She wanted to see that patient in office to discuss surgical intervention after the pt has spoken to the Cardiologist and Pulmonologist. Can we get both of these notes and have them scanned in please??

## 2023-08-11 NOTE — TELEPHONE ENCOUNTER
PATIENT'S  CALLED BACK IN TO CHECK ON SURGERY SCHEDULING - WANTS TO GET AN UPDATE - PLEASE CALL MEG BACK    STATES HE HAS A PAPER FROM THE HEART DOCTOR THAT WAS GIVEN TO HIM ON 08/01/23.  STATES IT IS A CARDIAC CLEARANCE LETTER -     THANK YOU!

## 2023-08-14 ENCOUNTER — OFFICE VISIT (OUTPATIENT)
Dept: NEUROSURGERY | Facility: CLINIC | Age: 68
End: 2023-08-14
Payer: MEDICARE

## 2023-08-14 VITALS
BODY MASS INDEX: 24.49 KG/M2 | WEIGHT: 147 LBS | SYSTOLIC BLOOD PRESSURE: 124 MMHG | DIASTOLIC BLOOD PRESSURE: 68 MMHG | HEIGHT: 65 IN

## 2023-08-14 DIAGNOSIS — M54.17 LUMBOSACRAL RADICULOPATHY AT L5: Primary | ICD-10-CM

## 2023-08-14 DIAGNOSIS — M48.061 STENOSIS OF LATERAL RECESS OF LUMBAR SPINE: ICD-10-CM

## 2023-08-14 DIAGNOSIS — Z98.1 S/P LUMBAR FUSION: ICD-10-CM

## 2023-08-14 PROBLEM — M47.816 LUMBAR SPONDYLOSIS: Status: ACTIVE | Noted: 2023-08-14

## 2023-08-14 PROCEDURE — 99213 OFFICE O/P EST LOW 20 MIN: CPT | Performed by: NEUROLOGICAL SURGERY

## 2023-08-14 RX ORDER — OXYCODONE HCL 10 MG/1
10 TABLET, FILM COATED, EXTENDED RELEASE ORAL ONCE
OUTPATIENT
Start: 2023-08-14 | End: 2023-08-14

## 2023-08-14 RX ORDER — IBUPROFEN 200 MG
800 TABLET ORAL ONCE
OUTPATIENT
Start: 2023-08-14 | End: 2023-08-14

## 2023-08-14 RX ORDER — SODIUM CHLORIDE 0.9 % (FLUSH) 0.9 %
10 SYRINGE (ML) INJECTION EVERY 12 HOURS SCHEDULED
OUTPATIENT
Start: 2023-08-14

## 2023-08-14 RX ORDER — ACETAMINOPHEN 325 MG/1
1000 TABLET ORAL ONCE
OUTPATIENT
Start: 2023-08-14 | End: 2023-08-14

## 2023-08-14 RX ORDER — CHLORHEXIDINE GLUCONATE 4 G/100ML
SOLUTION TOPICAL
Qty: 120 ML | Refills: 0 | Status: SHIPPED | OUTPATIENT
Start: 2023-08-14

## 2023-08-14 RX ORDER — SODIUM CHLORIDE 9 MG/ML
40 INJECTION, SOLUTION INTRAVENOUS AS NEEDED
OUTPATIENT
Start: 2023-08-14

## 2023-08-14 RX ORDER — PREGABALIN 75 MG/1
150 CAPSULE ORAL ONCE
OUTPATIENT
Start: 2023-08-14 | End: 2023-08-14

## 2023-08-14 RX ORDER — SODIUM CHLORIDE 0.9 % (FLUSH) 0.9 %
10 SYRINGE (ML) INJECTION AS NEEDED
OUTPATIENT
Start: 2023-08-14

## 2023-08-14 NOTE — PROGRESS NOTES
"Subjective     Chief Complaint: Low back pain, left-sided sciatica, history of lumbar fusion    Patient ID: Vibha Mcdowell is a 68 y.o. female is here today for follow-up.    History of Present Illness    This is a 68-year-old woman who underwent an L5-S1 fusion with Dr. Liriano many years ago.  She represented to my clinic with chief complaints of low back pain and left leg pain.  She underwent a myelogram and she presents today to discuss the results of the study.    The following portions of the patient's history were reviewed and updated as appropriate: allergies, current medications, past family history, past medical history, past social history, past surgical history and problem list.    Family history:   Family History   Problem Relation Age of Onset    Arthritis Mother     Cancer Mother     Heart disease Mother     Hypertension Mother     Arthritis Father     Diabetes Sister     Hypertension Sister        Social history:   Social History     Socioeconomic History    Marital status:    Tobacco Use    Smoking status: Former     Packs/day: 1.00     Types: Cigarettes    Smokeless tobacco: Never   Substance and Sexual Activity    Alcohol use: Never    Drug use: Never    Sexual activity: Defer       Review of Systems    Objective   Blood pressure 124/68, height 165.1 cm (65\"), weight 66.7 kg (147 lb).  Body mass index is 24.46 kg/mý.    Physical Exam  Constitutional:       General: She is not in acute distress.     Appearance: She is well-developed. She is not diaphoretic.   HENT:      Head: Normocephalic and atraumatic.   Pulmonary:      Effort: Pulmonary effort is normal.   Musculoskeletal:        Legs:    Skin:     General: Skin is warm and dry.   Neurological:      Mental Status: She is alert and oriented to person, place, and time.      Cranial Nerves: No cranial nerve deficit.       Assessment & Plan     Independent Review of Radiographic Studies:      Her myelogram demonstrates a fixed L4-5 " spondylolisthesis with severe lateral recess compression and radiographic evidence of L5 radiculopathy.    Medical Decision Making:      Informed consent was obtained for extension of her L5-S1 PLIF to an L4-5 PLIF.  She will need an L4 laminectomy as well.  All questions were answered.  No guarantees were given or implied.  The patient consents to proceed with surgery.  We will schedule her on an elective basis in the near future.    There are no diagnoses linked to this encounter.    No follow-ups on file.           This document signed by KANDACE Jacques MD August 14, 2023 13:17 EDT

## 2023-08-14 NOTE — H&P (VIEW-ONLY)
"Subjective     Chief Complaint: Low back pain, left-sided sciatica, history of lumbar fusion    Patient ID: Vibha Mcdowell is a 68 y.o. female is here today for follow-up.    History of Present Illness    This is a 68-year-old woman who underwent an L5-S1 fusion with Dr. Liriano many years ago.  She represented to my clinic with chief complaints of low back pain and left leg pain.  She underwent a myelogram and she presents today to discuss the results of the study.    The following portions of the patient's history were reviewed and updated as appropriate: allergies, current medications, past family history, past medical history, past social history, past surgical history and problem list.    Family history:   Family History   Problem Relation Age of Onset    Arthritis Mother     Cancer Mother     Heart disease Mother     Hypertension Mother     Arthritis Father     Diabetes Sister     Hypertension Sister        Social history:   Social History     Socioeconomic History    Marital status:    Tobacco Use    Smoking status: Former     Packs/day: 1.00     Types: Cigarettes    Smokeless tobacco: Never   Substance and Sexual Activity    Alcohol use: Never    Drug use: Never    Sexual activity: Defer       Review of Systems    Objective   Blood pressure 124/68, height 165.1 cm (65\"), weight 66.7 kg (147 lb).  Body mass index is 24.46 kg/mý.    Physical Exam  Constitutional:       General: She is not in acute distress.     Appearance: She is well-developed. She is not diaphoretic.   HENT:      Head: Normocephalic and atraumatic.   Pulmonary:      Effort: Pulmonary effort is normal.   Musculoskeletal:        Legs:    Skin:     General: Skin is warm and dry.   Neurological:      Mental Status: She is alert and oriented to person, place, and time.      Cranial Nerves: No cranial nerve deficit.       Assessment & Plan     Independent Review of Radiographic Studies:      Her myelogram demonstrates a fixed L4-5 " spondylolisthesis with severe lateral recess compression and radiographic evidence of L5 radiculopathy.    Medical Decision Making:      Informed consent was obtained for extension of her L5-S1 PLIF to an L4-5 PLIF.  She will need an L4 laminectomy as well.  All questions were answered.  No guarantees were given or implied.  The patient consents to proceed with surgery.  We will schedule her on an elective basis in the near future.    There are no diagnoses linked to this encounter.    No follow-ups on file.           This document signed by KANDACE Jacques MD August 14, 2023 13:17 EDT       forehead

## 2023-08-17 PROBLEM — Z98.1 S/P LUMBAR FUSION: Status: ACTIVE | Noted: 2023-08-17

## 2023-08-17 PROBLEM — M48.061 STENOSIS OF LATERAL RECESS OF LUMBAR SPINE: Status: ACTIVE | Noted: 2023-08-17

## 2023-08-21 ENCOUNTER — ANESTHESIA EVENT (OUTPATIENT)
Dept: PERIOP | Facility: HOSPITAL | Age: 68
End: 2023-08-21
Payer: MEDICARE

## 2023-08-21 RX ORDER — FAMOTIDINE 10 MG/ML
20 INJECTION, SOLUTION INTRAVENOUS ONCE
Status: CANCELLED | OUTPATIENT
Start: 2023-08-21 | End: 2023-08-21

## 2023-08-21 RX ORDER — SODIUM CHLORIDE 0.9 % (FLUSH) 0.9 %
10 SYRINGE (ML) INJECTION EVERY 12 HOURS SCHEDULED
Status: CANCELLED | OUTPATIENT
Start: 2023-08-21

## 2023-08-22 ENCOUNTER — APPOINTMENT (OUTPATIENT)
Dept: GENERAL RADIOLOGY | Facility: HOSPITAL | Age: 68
End: 2023-08-22
Payer: MEDICARE

## 2023-08-22 ENCOUNTER — ANESTHESIA (OUTPATIENT)
Dept: PERIOP | Facility: HOSPITAL | Age: 68
End: 2023-08-22
Payer: MEDICARE

## 2023-08-22 ENCOUNTER — HOSPITAL ENCOUNTER (OUTPATIENT)
Facility: HOSPITAL | Age: 68
LOS: 1 days | Discharge: HOME | End: 2023-08-24
Attending: NEUROLOGICAL SURGERY | Admitting: NEUROLOGICAL SURGERY
Payer: MEDICARE

## 2023-08-22 DIAGNOSIS — Z98.1 S/P LUMBAR FUSION: ICD-10-CM

## 2023-08-22 DIAGNOSIS — M54.17 LUMBOSACRAL RADICULOPATHY AT L5: ICD-10-CM

## 2023-08-22 DIAGNOSIS — M48.061 STENOSIS OF LATERAL RECESS OF LUMBAR SPINE: ICD-10-CM

## 2023-08-22 PROBLEM — J45.909 ASTHMA: Status: ACTIVE | Noted: 2023-08-22

## 2023-08-22 PROBLEM — E11.9 TYPE 2 DIABETES MELLITUS, WITHOUT LONG-TERM CURRENT USE OF INSULIN: Status: ACTIVE | Noted: 2023-08-22

## 2023-08-22 PROBLEM — I25.810 CORONARY ARTERY DISEASE INVOLVING CORONARY BYPASS GRAFT OF NATIVE HEART WITHOUT ANGINA PECTORIS: Status: ACTIVE | Noted: 2023-08-22

## 2023-08-22 LAB
ANION GAP SERPL CALCULATED.3IONS-SCNC: 11 MMOL/L (ref 5–15)
BUN SERPL-MCNC: 19 MG/DL (ref 8–23)
BUN/CREAT SERPL: 17.8 (ref 7–25)
CALCIUM SPEC-SCNC: 8.9 MG/DL (ref 8.6–10.5)
CHLORIDE SERPL-SCNC: 101 MMOL/L (ref 98–107)
CO2 SERPL-SCNC: 25 MMOL/L (ref 22–29)
CREAT SERPL-MCNC: 1.07 MG/DL (ref 0.57–1)
DEPRECATED RDW RBC AUTO: 54.1 FL (ref 37–54)
EGFRCR SERPLBLD CKD-EPI 2021: 56 ML/MIN/1.73
ERYTHROCYTE [DISTWIDTH] IN BLOOD BY AUTOMATED COUNT: 14.8 % (ref 12.3–15.4)
GLUCOSE BLDC GLUCOMTR-MCNC: 126 MG/DL (ref 70–130)
GLUCOSE BLDC GLUCOMTR-MCNC: 138 MG/DL (ref 70–130)
GLUCOSE BLDC GLUCOMTR-MCNC: 204 MG/DL (ref 70–130)
GLUCOSE BLDC GLUCOMTR-MCNC: 335 MG/DL (ref 70–130)
GLUCOSE SERPL-MCNC: 119 MG/DL (ref 65–99)
HCT VFR BLD AUTO: 44.8 % (ref 34–46.6)
HGB BLD-MCNC: 14.4 G/DL (ref 12–15.9)
MCH RBC QN AUTO: 31.6 PG (ref 26.6–33)
MCHC RBC AUTO-ENTMCNC: 32.1 G/DL (ref 31.5–35.7)
MCV RBC AUTO: 98.2 FL (ref 79–97)
PLATELET # BLD AUTO: 254 10*3/MM3 (ref 140–450)
PMV BLD AUTO: 9.2 FL (ref 6–12)
POTASSIUM SERPL-SCNC: 3.3 MMOL/L (ref 3.5–5.2)
QT INTERVAL: 482 MS
QTC INTERVAL: 482 MS
RBC # BLD AUTO: 4.56 10*6/MM3 (ref 3.77–5.28)
SODIUM SERPL-SCNC: 137 MMOL/L (ref 136–145)
WBC NRBC COR # BLD: 7.55 10*3/MM3 (ref 3.4–10.8)

## 2023-08-22 PROCEDURE — 22630 ARTHRD PST TQ 1NTRSPC LUM: CPT | Performed by: NEUROLOGICAL SURGERY

## 2023-08-22 PROCEDURE — 25010000002 PHENYLEPHRINE 10 MG/ML SOLUTION

## 2023-08-22 PROCEDURE — C1713 ANCHOR/SCREW BN/BN,TIS/BN: HCPCS | Performed by: NEUROLOGICAL SURGERY

## 2023-08-22 PROCEDURE — 25010000002 MIDAZOLAM PER 1 MG

## 2023-08-22 PROCEDURE — 63052 LAM FACETC/FRMT ARTHRD LUM 1: CPT | Performed by: PHYSICIAN ASSISTANT

## 2023-08-22 PROCEDURE — 63710000001 ACETAMINOPHEN EXTRA STRENGTH 500 MG TABLET: Performed by: NEUROLOGICAL SURGERY

## 2023-08-22 PROCEDURE — 61783 SCAN PROC SPINAL: CPT | Performed by: NEUROLOGICAL SURGERY

## 2023-08-22 PROCEDURE — 25010000002 HYDRALAZINE PER 20 MG

## 2023-08-22 PROCEDURE — 25010000002 DEXAMETHASONE SODIUM PHOSPHATE 10 MG/ML SOLUTION

## 2023-08-22 PROCEDURE — A9270 NON-COVERED ITEM OR SERVICE: HCPCS | Performed by: NEUROLOGICAL SURGERY

## 2023-08-22 PROCEDURE — 22840 INSERT SPINE FIXATION DEVICE: CPT | Performed by: NEUROLOGICAL SURGERY

## 2023-08-22 PROCEDURE — 25010000002 PHENYLEPHRINE 10 MG/ML SOLUTION 1 ML VIAL

## 2023-08-22 PROCEDURE — 93005 ELECTROCARDIOGRAM TRACING: CPT | Performed by: ANESTHESIOLOGY

## 2023-08-22 PROCEDURE — 63052 LAM FACETC/FRMT ARTHRD LUM 1: CPT | Performed by: NEUROLOGICAL SURGERY

## 2023-08-22 PROCEDURE — 22840 INSERT SPINE FIXATION DEVICE: CPT | Performed by: PHYSICIAN ASSISTANT

## 2023-08-22 PROCEDURE — 82948 REAGENT STRIP/BLOOD GLUCOSE: CPT

## 2023-08-22 PROCEDURE — 25010000002 FENTANYL CITRATE (PF) 50 MCG/ML SOLUTION

## 2023-08-22 PROCEDURE — 25010000002 CEFAZOLIN IN DEXTROSE 2-4 GM/100ML-% SOLUTION: Performed by: NEUROLOGICAL SURGERY

## 2023-08-22 PROCEDURE — 22630 ARTHRD PST TQ 1NTRSPC LUM: CPT | Performed by: PHYSICIAN ASSISTANT

## 2023-08-22 PROCEDURE — 80048 BASIC METABOLIC PNL TOTAL CA: CPT | Performed by: ANESTHESIOLOGY

## 2023-08-22 PROCEDURE — 93010 ELECTROCARDIOGRAM REPORT: CPT | Performed by: INTERNAL MEDICINE

## 2023-08-22 PROCEDURE — 25010000002 ONDANSETRON PER 1 MG

## 2023-08-22 PROCEDURE — 22853 INSJ BIOMECHANICAL DEVICE: CPT | Performed by: NEUROLOGICAL SURGERY

## 2023-08-22 PROCEDURE — 22830 EXPLORATION OF SPINAL FUSION: CPT | Performed by: NEUROLOGICAL SURGERY

## 2023-08-22 PROCEDURE — 76000 FLUOROSCOPY <1 HR PHYS/QHP: CPT

## 2023-08-22 PROCEDURE — 25010000002 DEXAMETHASONE SODIUM PHOSPHATE 10 MG/ML SOLUTION 1 ML VIAL: Performed by: NEUROLOGICAL SURGERY

## 2023-08-22 PROCEDURE — 25010000002 SUGAMMADEX 200 MG/2ML SOLUTION

## 2023-08-22 PROCEDURE — 25010000002 CEFAZOLIN IN DEXTROSE 2000 MG/ 100 ML SOLUTION: Performed by: NEUROLOGICAL SURGERY

## 2023-08-22 PROCEDURE — 22853 INSJ BIOMECHANICAL DEVICE: CPT | Performed by: PHYSICIAN ASSISTANT

## 2023-08-22 PROCEDURE — 25010000002 ESMOLOL 100 MG/10ML SOLUTION

## 2023-08-22 PROCEDURE — 25010000002 BUPIVACAINE (PF) 0.25 % SOLUTION 30 ML VIAL: Performed by: NEUROLOGICAL SURGERY

## 2023-08-22 PROCEDURE — 22830 EXPLORATION OF SPINAL FUSION: CPT | Performed by: PHYSICIAN ASSISTANT

## 2023-08-22 PROCEDURE — 85027 COMPLETE CBC AUTOMATED: CPT | Performed by: ANESTHESIOLOGY

## 2023-08-22 PROCEDURE — 25010000002 PROPOFOL 10 MG/ML EMULSION

## 2023-08-22 PROCEDURE — 0 POTASSIUM CHLORIDE PER 2 MEQ: Performed by: NEUROLOGICAL SURGERY

## 2023-08-22 DEVICE — SPACER 6068076 CATALYFT PL LONG 7MM
Type: IMPLANTABLE DEVICE | Site: SPINE LUMBAR | Status: FUNCTIONAL
Brand: CATALYFT PL EXPANDABLE INTERBODY SYSTEM

## 2023-08-22 DEVICE — FLOSEAL HEMOSTATIC MATRIX, 10ML
Type: IMPLANTABLE DEVICE | Site: SPINE LUMBAR | Status: FUNCTIONAL
Brand: FLOSEAL HEMOSTATIC MATRIX

## 2023-08-22 DEVICE — BONE GRAFT KIT 7510200 INFUSE SMALL
Type: IMPLANTABLE DEVICE | Site: SPINE LUMBAR | Status: FUNCTIONAL
Brand: INFUSE® BONE GRAFT

## 2023-08-22 DEVICE — HEMOST ABS SURGIFOAM SZ100 8X12 10MM: Type: IMPLANTABLE DEVICE | Site: SPINE LUMBAR | Status: FUNCTIONAL

## 2023-08-22 DEVICE — DBM T42200 2.5CMX10CM 2 EACH GRAFTON MAT
Type: IMPLANTABLE DEVICE | Site: SPINE LUMBAR | Status: FUNCTIONAL
Brand: GRAFTON®AND GRAFTON PLUS®DEMINERALIZED BONE MATRIX (DBM)

## 2023-08-22 DEVICE — SCREW 54840016545 CN MAS 6.5X45 CC
Type: IMPLANTABLE DEVICE | Site: SPINE LUMBAR | Status: FUNCTIONAL
Brand: CD HORIZON® SPINAL SYSTEM

## 2023-08-22 DEVICE — ALLOGRFT DBM GRAFTON DS INJ FIBR 9CC: Type: IMPLANTABLE DEVICE | Site: SPINE LUMBAR | Status: FUNCTIONAL

## 2023-08-22 RX ORDER — MIDAZOLAM HYDROCHLORIDE 1 MG/ML
INJECTION INTRAMUSCULAR; INTRAVENOUS AS NEEDED
Status: DISCONTINUED | OUTPATIENT
Start: 2023-08-22 | End: 2023-08-22 | Stop reason: SURG

## 2023-08-22 RX ORDER — HYDROCODONE BITARTRATE AND ACETAMINOPHEN 5; 325 MG/1; MG/1
1 TABLET ORAL ONCE AS NEEDED
Status: DISCONTINUED | OUTPATIENT
Start: 2023-08-22 | End: 2023-08-22

## 2023-08-22 RX ORDER — SODIUM CHLORIDE 0.9 % (FLUSH) 0.9 %
3-10 SYRINGE (ML) INJECTION AS NEEDED
Status: DISCONTINUED | OUTPATIENT
Start: 2023-08-22 | End: 2023-08-22

## 2023-08-22 RX ORDER — FAMOTIDINE 20 MG/1
20 TABLET, FILM COATED ORAL ONCE
Status: COMPLETED | OUTPATIENT
Start: 2023-08-22 | End: 2023-08-22

## 2023-08-22 RX ORDER — ONDANSETRON 2 MG/ML
4 INJECTION INTRAMUSCULAR; INTRAVENOUS EVERY 6 HOURS PRN
Status: DISCONTINUED | OUTPATIENT
Start: 2023-08-22 | End: 2023-08-24 | Stop reason: HOSPADM

## 2023-08-22 RX ORDER — DEXAMETHASONE SODIUM PHOSPHATE 10 MG/ML
INJECTION, SOLUTION INTRAMUSCULAR; INTRAVENOUS AS NEEDED
Status: DISCONTINUED | OUTPATIENT
Start: 2023-08-22 | End: 2023-08-22 | Stop reason: SURG

## 2023-08-22 RX ORDER — ATORVASTATIN CALCIUM 40 MG/1
80 TABLET, FILM COATED ORAL NIGHTLY
Status: DISCONTINUED | OUTPATIENT
Start: 2023-08-22 | End: 2023-08-24 | Stop reason: HOSPADM

## 2023-08-22 RX ORDER — GABAPENTIN 400 MG/1
400 CAPSULE ORAL 3 TIMES DAILY
Status: DISCONTINUED | OUTPATIENT
Start: 2023-08-22 | End: 2023-08-24 | Stop reason: HOSPADM

## 2023-08-22 RX ORDER — VALSARTAN 160 MG/1
320 TABLET ORAL
Status: DISCONTINUED | OUTPATIENT
Start: 2023-08-22 | End: 2023-08-24 | Stop reason: HOSPADM

## 2023-08-22 RX ORDER — LAMOTRIGINE 100 MG/1
100 TABLET ORAL 2 TIMES DAILY
Status: DISCONTINUED | OUTPATIENT
Start: 2023-08-22 | End: 2023-08-24 | Stop reason: HOSPADM

## 2023-08-22 RX ORDER — MAGNESIUM HYDROXIDE 1200 MG/15ML
LIQUID ORAL AS NEEDED
Status: DISCONTINUED | OUTPATIENT
Start: 2023-08-22 | End: 2023-08-22 | Stop reason: HOSPADM

## 2023-08-22 RX ORDER — ALPRAZOLAM 0.5 MG/1
0.5 TABLET ORAL NIGHTLY PRN
Status: DISCONTINUED | OUTPATIENT
Start: 2023-08-22 | End: 2023-08-24 | Stop reason: HOSPADM

## 2023-08-22 RX ORDER — PANTOPRAZOLE SODIUM 40 MG/1
40 TABLET, DELAYED RELEASE ORAL
Status: DISCONTINUED | OUTPATIENT
Start: 2023-08-23 | End: 2023-08-24 | Stop reason: HOSPADM

## 2023-08-22 RX ORDER — ASPIRIN 81 MG/1
81 TABLET ORAL DAILY
Status: DISCONTINUED | OUTPATIENT
Start: 2023-08-22 | End: 2023-08-24 | Stop reason: HOSPADM

## 2023-08-22 RX ORDER — PROMETHAZINE HYDROCHLORIDE 25 MG/1
25 SUPPOSITORY RECTAL ONCE AS NEEDED
Status: DISCONTINUED | OUTPATIENT
Start: 2023-08-22 | End: 2023-08-22

## 2023-08-22 RX ORDER — ROCURONIUM BROMIDE 10 MG/ML
INJECTION, SOLUTION INTRAVENOUS AS NEEDED
Status: DISCONTINUED | OUTPATIENT
Start: 2023-08-22 | End: 2023-08-22 | Stop reason: SURG

## 2023-08-22 RX ORDER — PROMETHAZINE HYDROCHLORIDE 25 MG/1
25 TABLET ORAL ONCE AS NEEDED
Status: DISCONTINUED | OUTPATIENT
Start: 2023-08-22 | End: 2023-08-22

## 2023-08-22 RX ORDER — SODIUM CHLORIDE AND POTASSIUM CHLORIDE 150; 450 MG/100ML; MG/100ML
100 INJECTION, SOLUTION INTRAVENOUS CONTINUOUS
Status: DISCONTINUED | OUTPATIENT
Start: 2023-08-22 | End: 2023-08-24 | Stop reason: HOSPADM

## 2023-08-22 RX ORDER — ESMOLOL HYDROCHLORIDE 10 MG/ML
INJECTION INTRAVENOUS AS NEEDED
Status: DISCONTINUED | OUTPATIENT
Start: 2023-08-22 | End: 2023-08-22 | Stop reason: SURG

## 2023-08-22 RX ORDER — LIDOCAINE HYDROCHLORIDE AND EPINEPHRINE 5; 5 MG/ML; UG/ML
INJECTION, SOLUTION INFILTRATION; PERINEURAL AS NEEDED
Status: DISCONTINUED | OUTPATIENT
Start: 2023-08-22 | End: 2023-08-22 | Stop reason: HOSPADM

## 2023-08-22 RX ORDER — HYDROMORPHONE HYDROCHLORIDE 1 MG/ML
0.5 INJECTION, SOLUTION INTRAMUSCULAR; INTRAVENOUS; SUBCUTANEOUS
Status: DISCONTINUED | OUTPATIENT
Start: 2023-08-22 | End: 2023-08-22

## 2023-08-22 RX ORDER — ONDANSETRON 4 MG/1
4 TABLET, FILM COATED ORAL EVERY 6 HOURS PRN
Status: DISCONTINUED | OUTPATIENT
Start: 2023-08-22 | End: 2023-08-24 | Stop reason: HOSPADM

## 2023-08-22 RX ORDER — FENTANYL CITRATE 50 UG/ML
INJECTION, SOLUTION INTRAMUSCULAR; INTRAVENOUS
Status: COMPLETED
Start: 2023-08-22 | End: 2023-08-22

## 2023-08-22 RX ORDER — ONDANSETRON 2 MG/ML
4 INJECTION INTRAMUSCULAR; INTRAVENOUS ONCE AS NEEDED
Status: DISCONTINUED | OUTPATIENT
Start: 2023-08-22 | End: 2023-08-22

## 2023-08-22 RX ORDER — LIDOCAINE HYDROCHLORIDE 10 MG/ML
0.5 INJECTION, SOLUTION EPIDURAL; INFILTRATION; INTRACAUDAL; PERINEURAL ONCE AS NEEDED
Status: COMPLETED | OUTPATIENT
Start: 2023-08-22 | End: 2023-08-22

## 2023-08-22 RX ORDER — PROPOFOL 10 MG/ML
VIAL (ML) INTRAVENOUS AS NEEDED
Status: DISCONTINUED | OUTPATIENT
Start: 2023-08-22 | End: 2023-08-22 | Stop reason: SURG

## 2023-08-22 RX ORDER — SODIUM CHLORIDE, SODIUM LACTATE, POTASSIUM CHLORIDE, CALCIUM CHLORIDE 600; 310; 30; 20 MG/100ML; MG/100ML; MG/100ML; MG/100ML
9 INJECTION, SOLUTION INTRAVENOUS CONTINUOUS
Status: DISCONTINUED | OUTPATIENT
Start: 2023-08-22 | End: 2023-08-22

## 2023-08-22 RX ORDER — OXYCODONE HYDROCHLORIDE AND ACETAMINOPHEN 5; 325 MG/1; MG/1
1 TABLET ORAL EVERY 4 HOURS PRN
Status: DISCONTINUED | OUTPATIENT
Start: 2023-08-22 | End: 2023-08-24 | Stop reason: HOSPADM

## 2023-08-22 RX ORDER — SODIUM CHLORIDE 0.9 % (FLUSH) 0.9 %
10 SYRINGE (ML) INJECTION AS NEEDED
Status: DISCONTINUED | OUTPATIENT
Start: 2023-08-22 | End: 2023-08-22 | Stop reason: HOSPADM

## 2023-08-22 RX ORDER — ONDANSETRON 2 MG/ML
INJECTION INTRAMUSCULAR; INTRAVENOUS AS NEEDED
Status: DISCONTINUED | OUTPATIENT
Start: 2023-08-22 | End: 2023-08-22 | Stop reason: SURG

## 2023-08-22 RX ORDER — NALOXONE HCL 0.4 MG/ML
0.4 VIAL (ML) INJECTION AS NEEDED
Status: DISCONTINUED | OUTPATIENT
Start: 2023-08-22 | End: 2023-08-22

## 2023-08-22 RX ORDER — TIZANIDINE 4 MG/1
6 TABLET ORAL 3 TIMES DAILY
Status: DISCONTINUED | OUTPATIENT
Start: 2023-08-22 | End: 2023-08-24 | Stop reason: HOSPADM

## 2023-08-22 RX ORDER — FENTANYL CITRATE 50 UG/ML
50 INJECTION, SOLUTION INTRAMUSCULAR; INTRAVENOUS
Status: DISCONTINUED | OUTPATIENT
Start: 2023-08-22 | End: 2023-08-22

## 2023-08-22 RX ORDER — ALBUTEROL SULFATE 90 UG/1
2 AEROSOL, METERED RESPIRATORY (INHALATION) EVERY 4 HOURS PRN
Status: DISCONTINUED | OUTPATIENT
Start: 2023-08-22 | End: 2023-08-24 | Stop reason: HOSPADM

## 2023-08-22 RX ORDER — PREGABALIN 150 MG/1
150 CAPSULE ORAL ONCE
Status: COMPLETED | OUTPATIENT
Start: 2023-08-22 | End: 2023-08-22

## 2023-08-22 RX ORDER — OXYCODONE HCL 10 MG/1
10 TABLET, FILM COATED, EXTENDED RELEASE ORAL ONCE
Status: DISCONTINUED | OUTPATIENT
Start: 2023-08-22 | End: 2023-08-22 | Stop reason: HOSPADM

## 2023-08-22 RX ORDER — SODIUM CHLORIDE 0.9 % (FLUSH) 0.9 %
10 SYRINGE (ML) INJECTION EVERY 12 HOURS SCHEDULED
Status: DISCONTINUED | OUTPATIENT
Start: 2023-08-22 | End: 2023-08-22 | Stop reason: HOSPADM

## 2023-08-22 RX ORDER — OXYCODONE AND ACETAMINOPHEN 10; 325 MG/1; MG/1
1 TABLET ORAL EVERY 4 HOURS PRN
Status: DISCONTINUED | OUTPATIENT
Start: 2023-08-22 | End: 2023-08-24 | Stop reason: HOSPADM

## 2023-08-22 RX ORDER — ACETAMINOPHEN 325 MG/1
650 TABLET ORAL EVERY 4 HOURS PRN
Status: DISCONTINUED | OUTPATIENT
Start: 2023-08-22 | End: 2023-08-24 | Stop reason: HOSPADM

## 2023-08-22 RX ORDER — CEFAZOLIN SODIUM 2 G/100ML
2 INJECTION, SOLUTION INTRAVENOUS EVERY 8 HOURS
Status: COMPLETED | OUTPATIENT
Start: 2023-08-22 | End: 2023-08-23

## 2023-08-22 RX ORDER — MIDAZOLAM HYDROCHLORIDE 1 MG/ML
0.5 INJECTION INTRAMUSCULAR; INTRAVENOUS
Status: DISCONTINUED | OUTPATIENT
Start: 2023-08-22 | End: 2023-08-22 | Stop reason: HOSPADM

## 2023-08-22 RX ORDER — METHOCARBAMOL 500 MG/1
1000 TABLET, FILM COATED ORAL 4 TIMES DAILY PRN
Status: DISCONTINUED | OUTPATIENT
Start: 2023-08-22 | End: 2023-08-24 | Stop reason: HOSPADM

## 2023-08-22 RX ORDER — HYDRALAZINE HYDROCHLORIDE 20 MG/ML
5 INJECTION INTRAMUSCULAR; INTRAVENOUS
Status: DISCONTINUED | OUTPATIENT
Start: 2023-08-22 | End: 2023-08-22

## 2023-08-22 RX ORDER — SODIUM CHLORIDE 9 MG/ML
40 INJECTION, SOLUTION INTRAVENOUS AS NEEDED
Status: DISCONTINUED | OUTPATIENT
Start: 2023-08-22 | End: 2023-08-22 | Stop reason: HOSPADM

## 2023-08-22 RX ORDER — LIDOCAINE HYDROCHLORIDE 10 MG/ML
INJECTION, SOLUTION EPIDURAL; INFILTRATION; INTRACAUDAL; PERINEURAL AS NEEDED
Status: DISCONTINUED | OUTPATIENT
Start: 2023-08-22 | End: 2023-08-22 | Stop reason: SURG

## 2023-08-22 RX ORDER — SODIUM CHLORIDE 0.9 % (FLUSH) 0.9 %
3 SYRINGE (ML) INJECTION EVERY 12 HOURS SCHEDULED
Status: DISCONTINUED | OUTPATIENT
Start: 2023-08-22 | End: 2023-08-22

## 2023-08-22 RX ORDER — AMOXICILLIN 250 MG
1 CAPSULE ORAL NIGHTLY PRN
Status: DISCONTINUED | OUTPATIENT
Start: 2023-08-22 | End: 2023-08-24 | Stop reason: HOSPADM

## 2023-08-22 RX ORDER — ACETAMINOPHEN 500 MG
1000 TABLET ORAL ONCE
Status: COMPLETED | OUTPATIENT
Start: 2023-08-22 | End: 2023-08-22

## 2023-08-22 RX ORDER — SODIUM CHLORIDE 9 MG/ML
40 INJECTION, SOLUTION INTRAVENOUS AS NEEDED
Status: DISCONTINUED | OUTPATIENT
Start: 2023-08-22 | End: 2023-08-22

## 2023-08-22 RX ORDER — HYDROCHLOROTHIAZIDE 25 MG/1
25 TABLET ORAL
Status: DISCONTINUED | OUTPATIENT
Start: 2023-08-22 | End: 2023-08-24 | Stop reason: HOSPADM

## 2023-08-22 RX ORDER — IBUPROFEN 800 MG/1
800 TABLET ORAL ONCE
Status: COMPLETED | OUTPATIENT
Start: 2023-08-22 | End: 2023-08-22

## 2023-08-22 RX ORDER — BISACODYL 5 MG/1
5 TABLET, DELAYED RELEASE ORAL DAILY PRN
Status: DISCONTINUED | OUTPATIENT
Start: 2023-08-22 | End: 2023-08-24 | Stop reason: HOSPADM

## 2023-08-22 RX ORDER — CEFAZOLIN SODIUM 2 G/100ML
2 INJECTION, SOLUTION INTRAVENOUS ONCE
Status: COMPLETED | OUTPATIENT
Start: 2023-08-22 | End: 2023-08-22

## 2023-08-22 RX ORDER — HYDRALAZINE HYDROCHLORIDE 20 MG/ML
INJECTION INTRAMUSCULAR; INTRAVENOUS
Status: COMPLETED
Start: 2023-08-22 | End: 2023-08-22

## 2023-08-22 RX ORDER — PHENYLEPHRINE HYDROCHLORIDE 10 MG/ML
INJECTION INTRAVENOUS AS NEEDED
Status: DISCONTINUED | OUTPATIENT
Start: 2023-08-22 | End: 2023-08-22 | Stop reason: SURG

## 2023-08-22 RX ORDER — NIFEDIPINE 30 MG/1
30 TABLET, EXTENDED RELEASE ORAL DAILY
Status: DISCONTINUED | OUTPATIENT
Start: 2023-08-22 | End: 2023-08-24 | Stop reason: HOSPADM

## 2023-08-22 RX ORDER — POLYETHYLENE GLYCOL 3350 17 G/17G
17 POWDER, FOR SOLUTION ORAL DAILY PRN
Status: DISCONTINUED | OUTPATIENT
Start: 2023-08-22 | End: 2023-08-24 | Stop reason: HOSPADM

## 2023-08-22 RX ORDER — CITALOPRAM 20 MG/1
20 TABLET ORAL DAILY
Status: DISCONTINUED | OUTPATIENT
Start: 2023-08-22 | End: 2023-08-24 | Stop reason: HOSPADM

## 2023-08-22 RX ORDER — OXYCODONE AND ACETAMINOPHEN 10; 325 MG/1; MG/1
1 TABLET ORAL EVERY 8 HOURS PRN
Status: DISCONTINUED | OUTPATIENT
Start: 2023-08-22 | End: 2023-08-22 | Stop reason: SDUPTHER

## 2023-08-22 RX ORDER — IBUPROFEN 400 MG/1
200 TABLET ORAL EVERY 4 HOURS PRN
Status: DISCONTINUED | OUTPATIENT
Start: 2023-08-22 | End: 2023-08-24 | Stop reason: HOSPADM

## 2023-08-22 RX ORDER — IPRATROPIUM BROMIDE AND ALBUTEROL SULFATE 2.5; .5 MG/3ML; MG/3ML
3 SOLUTION RESPIRATORY (INHALATION) ONCE AS NEEDED
Status: DISCONTINUED | OUTPATIENT
Start: 2023-08-22 | End: 2023-08-22

## 2023-08-22 RX ADMIN — HYDRALAZINE HYDROCHLORIDE 5 MG: 20 INJECTION INTRAMUSCULAR; INTRAVENOUS at 16:50

## 2023-08-22 RX ADMIN — PHENYLEPHRINE HYDROCHLORIDE 100 MCG: 10 INJECTION INTRAVENOUS at 10:45

## 2023-08-22 RX ADMIN — ACETAMINOPHEN 1000 MG: 500 TABLET ORAL at 08:11

## 2023-08-22 RX ADMIN — PHENYLEPHRINE HYDROCHLORIDE 200 MCG: 10 INJECTION INTRAVENOUS at 10:08

## 2023-08-22 RX ADMIN — DEXAMETHASONE SODIUM PHOSPHATE 10 MG: 10 INJECTION, SOLUTION INTRAMUSCULAR; INTRAVENOUS at 10:16

## 2023-08-22 RX ADMIN — HYDRALAZINE HYDROCHLORIDE 5 MG: 20 INJECTION INTRAMUSCULAR; INTRAVENOUS at 15:11

## 2023-08-22 RX ADMIN — SUGAMMADEX 200 MG: 100 INJECTION, SOLUTION INTRAVENOUS at 12:58

## 2023-08-22 RX ADMIN — PREGABALIN 150 MG: 150 CAPSULE ORAL at 08:11

## 2023-08-22 RX ADMIN — ROCURONIUM BROMIDE 10 MG: 10 SOLUTION INTRAVENOUS at 11:18

## 2023-08-22 RX ADMIN — IBUPROFEN 800 MG: 800 TABLET, FILM COATED ORAL at 08:11

## 2023-08-22 RX ADMIN — PROPOFOL 150 MG: 10 INJECTION, EMULSION INTRAVENOUS at 10:08

## 2023-08-22 RX ADMIN — ROCURONIUM BROMIDE 30 MG: 10 SOLUTION INTRAVENOUS at 10:08

## 2023-08-22 RX ADMIN — ROCURONIUM BROMIDE 10 MG: 10 SOLUTION INTRAVENOUS at 12:19

## 2023-08-22 RX ADMIN — PROPOFOL 25 MCG/KG/MIN: 10 INJECTION, EMULSION INTRAVENOUS at 10:20

## 2023-08-22 RX ADMIN — FENTANYL CITRATE 50 MCG: 50 INJECTION, SOLUTION INTRAMUSCULAR; INTRAVENOUS at 14:20

## 2023-08-22 RX ADMIN — CITALOPRAM HYDROBROMIDE 20 MG: 20 TABLET ORAL at 18:18

## 2023-08-22 RX ADMIN — ATORVASTATIN CALCIUM 80 MG: 40 TABLET, FILM COATED ORAL at 20:08

## 2023-08-22 RX ADMIN — POTASSIUM CHLORIDE AND SODIUM CHLORIDE 100 ML/HR: 450; 150 INJECTION, SOLUTION INTRAVENOUS at 18:18

## 2023-08-22 RX ADMIN — OXYCODONE HYDROCHLORIDE AND ACETAMINOPHEN 1 TABLET: 10; 325 TABLET ORAL at 18:18

## 2023-08-22 RX ADMIN — ESMOLOL HYDROCHLORIDE 40 MG: 10 INJECTION, SOLUTION INTRAVENOUS at 10:08

## 2023-08-22 RX ADMIN — GABAPENTIN 400 MG: 400 CAPSULE ORAL at 18:18

## 2023-08-22 RX ADMIN — PHENYLEPHRINE HYDROCHLORIDE 100 MCG: 10 INJECTION INTRAVENOUS at 12:19

## 2023-08-22 RX ADMIN — FAMOTIDINE 20 MG: 20 TABLET ORAL at 08:11

## 2023-08-22 RX ADMIN — ROCURONIUM BROMIDE 10 MG: 10 SOLUTION INTRAVENOUS at 10:44

## 2023-08-22 RX ADMIN — PHENYLEPHRINE HYDROCHLORIDE 20 MCG/MIN: 10 INJECTION INTRAVENOUS at 10:45

## 2023-08-22 RX ADMIN — ONDANSETRON 4 MG: 2 INJECTION INTRAMUSCULAR; INTRAVENOUS at 12:41

## 2023-08-22 RX ADMIN — LIDOCAINE HYDROCHLORIDE 50 MG: 10 INJECTION, SOLUTION EPIDURAL; INFILTRATION; INTRACAUDAL; PERINEURAL at 10:08

## 2023-08-22 RX ADMIN — MIDAZOLAM HYDROCHLORIDE 2 MG: 1 INJECTION, SOLUTION INTRAMUSCULAR; INTRAVENOUS at 10:05

## 2023-08-22 RX ADMIN — CEFAZOLIN SODIUM 2 G: 2 INJECTION, SOLUTION INTRAVENOUS at 10:16

## 2023-08-22 RX ADMIN — TIZANIDINE 6 MG: 4 TABLET ORAL at 18:18

## 2023-08-22 RX ADMIN — SODIUM CHLORIDE, POTASSIUM CHLORIDE, SODIUM LACTATE AND CALCIUM CHLORIDE 9 ML/HR: 600; 310; 30; 20 INJECTION, SOLUTION INTRAVENOUS at 08:10

## 2023-08-22 RX ADMIN — HYDRALAZINE HYDROCHLORIDE 5 MG: 20 INJECTION INTRAMUSCULAR; INTRAVENOUS at 17:19

## 2023-08-22 RX ADMIN — LIDOCAINE HYDROCHLORIDE 0.5 ML: 10 INJECTION, SOLUTION EPIDURAL; INFILTRATION; INTRACAUDAL; PERINEURAL at 08:10

## 2023-08-22 RX ADMIN — LAMOTRIGINE 100 MG: 100 TABLET ORAL at 20:08

## 2023-08-22 RX ADMIN — CEFAZOLIN SODIUM 2 G: 2 INJECTION, SOLUTION INTRAVENOUS at 18:18

## 2023-08-22 RX ADMIN — ROCURONIUM BROMIDE 10 MG: 10 SOLUTION INTRAVENOUS at 11:34

## 2023-08-22 RX ADMIN — PHENYLEPHRINE HYDROCHLORIDE 100 MCG: 10 INJECTION INTRAVENOUS at 13:10

## 2023-08-22 NOTE — OP NOTE
Pre-Operative Diagnosis:  1.  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  Bilateral L5 radiculopathy  3.  History of L5-S1 fusion  Post-Operative Diagnosis: Same    Procedures performed:  L4-5 posterior lumbar fusion  Application of posterior instrumentation, 1 spinal segment, L4-5  Insertion of machined interbody graft x2  Removal of spinal instrumentation, 1 spinal segment, L5 pedicle screw x2 and S1 pedicle screw x2  Exploration of prior L5-S1 fusion  Revision of prior left-sided hemilaminotomy/discectomy  Harvesting of local autograft  Application of morselized allograft  Use of intraoperative navigation for spinal instrumentation    Spinal Surgery Levels Completed:1 Level     Estimated Blood Loss: <100 mL    Indication for procedure: This is a 68-year-old woman who underwent a prior L5-S1 posterior lumbar interbody fusion with Dr. Liriano about 11 years ago.  She represented to my clinic with chief complaints of worsening low back pain with radiating pain into her buttocks and legs, in an L5 distribution.  She has diffuse degenerative disc disease throughout her lumbar spine as well as osteopenia seen on her CT scan, however she does have clinical and radiographic evidence of bilateral L5 nerve root compression.  Surgical intervention is indicated.    Informed consent for this procedure was obtained from the patient.  The patient acknowledges risk of nerve root injury, paralysis, weakness, loss of sensation, permanent neurologic disability, bleeding, infection, iatrogenic instability, instrumentation failure, pseudoarthrosis, failure of benefit of the operation, spinal fluid leak, or need for additional procedures.  All questions were answered prior to beginning the procedure.  No guarantees were given or implied.  The patient elects to proceed with surgery.  She acknowledges that she is at increased risk of periprocedural complications due to her poor bone quality, her history of prior surgery, and her chronic lung  disease.    Procedure in detail: The patient was identified in the preoperative holding area, where She underwent the uneventful induction of general, endotracheal anesthesia.  Venodyne's were placed by the nursing staff.  The patient was then gently rotated to the prone position on the Edilberto OSI table.  Pressure points were inspected, and appropriately padded.  The patient's lumbar spine was then prepped and draped in the usual, sterile fashion.  A timeout was performed.  Intravenous antibiotics were administered.    Using lateral fluoroscopy, a spinal needle was then directed towards the junction of the transverse process and pedicles at L4, L5, S1  using lateral fluoroscopic visualization.  At each level, approximately 10 mL of the neurosurgical cocktail consisting of buprenorphine, Marcaine, and Decadron was then infiltrated into the paraspinous musculature.    A midline, vertically oriented skin incision was then made over the L4/L5 and L5/S1 after anesthetizing the skin.  The paraspinous musculature was then reflected off of the spinous process and lamina at L4 and L5.      This portion of the procedure was tedious.  The prior surgeon had placed pedicle screws with an extremely lateralized trajectory, presumably a percutaneous approach, and this required an extremely far lateral exposure to visualize the polyaxial heads and the set screws at L5 and S1.  Difficulty, I was ultimately able to expose the polyaxial heads at L5 and S1 bilaterally.  The setscrews were removed followed by the connecting rods.  The screws were removed at S1.  Gently pulling up on the spinous process of L5 and inspecting the facet joints at L5-S1, I was able to confirm that there was solid bony fusion at L5-S1.    The self-retaining retractors were placed.  The reference   Array was affixed to the inferior aspect of the spinous process at L3.  A 3-dimensional spin was then obtained.  The patient's anatomy was registered with the  navigation system.    Entry points into the pedicles at L4 were identified.  The pedicle screws at L5 were removed bilaterally and replaced with larger diameter screws.  Please refer to the implantation log for the details of the sizes of the screws that were used, but 7.5 diameter screws were used at L5 6.5 mm diameter screws were used at L4.  The Campaign Monitor Solera 4.75 mm system was used.     A complete laminectomy was then carried out using the high-speed drill and the Kerrison rongeurs.  As I was working inferiorly, particularly on the left side, I encountered an excessive amount of scar tissue which was presumably due to the patient's prior TLIF at this level.  Great care was taken to protect the thecal sac and attempt to decompress the descending L5 nerve which was heavily compressed dorsally on both sides, again with more scar tissue on the left as compared to the right.  The thecal sac was retracted medially, and the exiting nerve root was identified and protected.  An annulotomy was then carried out at theL4/L5 interspace.  A complete discectomy was then carried out.    After preparing the endplates and completing the discectomy, a 7 x 28 mm Catalyft interbody device was inserted into the disc space first on the patient's right side.  This was gently expanded to its nominal height while taking care not to fracture the endplates.  While I was putting in the pedicle screws I did reconfirm that the patient has extremely poor bone quality and probably meets criteria for osteoporosis.    A similar procedure was subsequently carried out on the other side.    Harvested allograft and autograft and infuse BMP were packed into the disc space and into the facet joints bilaterally, after decorticating the facet joints.  Epidural oozing was controlled using thrombin foam.  4.75 mm rods were then placed into the polyaxial heads and affixed using the set screws according to the manufacture's specifications.  Lateral  fluoroscopy demonstrated satisfactory position of the implanted devices, and confirmed the operative levels.    A 10 flat drain was then left in the subfascial space and tunneled out through separate stab incision.  The fascia and skin were then closed in layers.  Glue and a sterile dressing were then applied.    Sponge, instrument, and needle counts were all correct at the conclusion of the case.    Ellie Pantoja physician assistant was responsible for performing the following activities: Retraction, Suction, Irrigation, Suturing and Closing and their skilled assistance was necessary for the success of this case.

## 2023-08-22 NOTE — INTERVAL H&P NOTE
"[facility] Pre-op    Full history and physical note from office is attached.    /65 (BP Location: Right arm, Patient Position: Lying)   Pulse 59   Temp 97.6 øF (36.4 øC) (Temporal)   Resp 18   Ht 165.1 cm (65\")   Wt 66.7 kg (147 lb 0.8 oz)   SpO2 98%   BMI 24.47 kg/mý       LAB Results:  Lab Results   Component Value Date    WBC 7.55 08/22/2023    HGB 14.4 08/22/2023    HCT 44.8 08/22/2023    MCV 98.2 (H) 08/22/2023     08/22/2023 7/19/23 lumbar CT:  Findings:  Postoperative changes are present from prior L5-S1 fusion, without evidence of hardware displacement or fracture. There is mild retrolisthesis of L4 on L5, with alignment otherwise anatomic. There is moderate diffuse demineralization. The paraspinal soft   tissues demonstrate extensive polycystic kidneys and aortoiliac atherosclerosis. Multilevel spondylosis is noted, with areas of involvement including     L1-2, no significant spinal canal or neuroforaminal impingement.     L2-3, small disc bulge and bilateral facet arthropathy. There is mild spinal canal narrowing. The neural foramina are patent.     L3-4, circumferential disc bulge and bilateral facet arthropathy. There is moderate to severe spinal canal narrowing in addition to mild right neuroforaminal stenosis.     L4-5, circumferential disc bulge, ligamentum flavum thickening and bilateral facet arthropathy. There is severe spinal canal narrowing in addition to moderate to severe bilateral neuroforaminal stenosis.     L5-S1, postoperative changes with decompressed spinal canal. There is some persistent moderate left neuroforaminal narrowing.    Impression:   Redemonstrated postoperative changes from prior L5-S1 decompression and fusion. The operative level demonstrates decompressed spinal canal with some persistent moderate left neuroforaminal narrowing. Adjacent level moderate to severe spondylosis is also   noted at L3-4 and L4-5 as above, similar to prior " MRI.    Cancer Staging (if applicable)  Cancer Patient: __ yes __no __unknown__N/A; If yes, clinical stage T:__ N:__M:__, stage group or __N/A      Impression: lumbar spondylosis       Plan: L4-5 posterior fusion with decompression and exploration of prior L5-S1 fusion, use of bone morphogenic protein      MATTHIEU Lugo   8/22/2023   08:20 EDT

## 2023-08-22 NOTE — ANESTHESIA POSTPROCEDURE EVALUATION
Patient: Vibha Mcdowell    Procedure Summary       Date: 08/22/23 Room / [facility]:  [facility]    Anesthesia Start: 1005 Anesthesia Stop: 1335    Procedure: L4-5 posterior fusion with decompression and exploration of prior L5-S1 fusion, use of bone morphogenic protein (Spine Lumbar) Diagnosis:       Stenosis of lateral recess of lumbar spine      Lumbosacral radiculopathy at L5      S/P lumbar fusion      (Stenosis of lateral recess of lumbar spine [M48.061])      (Lumbosacral radiculopathy at L5 [M54.17])      (S/P lumbar fusion [Z98.1])    Surgeons: Honorio Jacques MD Provider: Jose Maria Otoole MD    Anesthesia Type: general ASA Status: 3            Anesthesia Type: general    Vitals  Vitals Value Taken Time   /66 08/22/23 1332   Temp     Pulse 53 08/22/23 1334   Resp     SpO2 100 % 08/22/23 1334   Vitals shown include unvalidated device data.        Post Anesthesia Care and Evaluation    Patient location during evaluation: PACU  Patient participation: complete - patient participated  Level of consciousness: sleepy but conscious  Pain management: adequate    Airway patency: patent  Anesthetic complications: No anesthetic complications  PONV Status: none  Cardiovascular status: hemodynamically stable and acceptable  Respiratory status: nonlabored ventilation, acceptable, oral airway and nasal cannula  Hydration status: acceptable

## 2023-08-22 NOTE — ANESTHESIA PREPROCEDURE EVALUATION
Anesthesia Evaluation     Patient summary reviewed and Nursing notes reviewed                Airway   Mallampati: II  TM distance: >3 FB  Neck ROM: full  No difficulty expected  Dental - normal exam   (+) upper dentures and poor dentition    Pulmonary - normal exam   (+) a smoker (2021) Former, asthma,home oxygen (3L @ night)  Cardiovascular - normal exam    (+) hypertension, CAD, CABG (8 yrs ago), hyperlipidemia    ROS comment:   RBBB    Intermediate risk per cardiology    Neuro/Psych- negative ROS  GI/Hepatic/Renal/Endo    (+) GERD, diabetes mellitus, thyroid problem hypothyroidism    Musculoskeletal (-) negative ROS    Abdominal  - normal exam    Bowel sounds: normal.   Substance History - negative use     OB/GYN negative ob/gyn ROS         Other                        Anesthesia Plan    ASA 3     general     intravenous induction     Anesthetic plan, risks, benefits, and alternatives have been provided, discussed and informed consent has been obtained with: patient.    Plan discussed with CRNA.    CODE STATUS:

## 2023-08-22 NOTE — ANESTHESIA PROCEDURE NOTES
Airway  Urgency: elective    Date/Time: 8/22/2023 10:11 AM  Airway not difficult    General Information and Staff    Patient location during procedure: OR  SRNA: Marixa Messina SRNA  Indications and Patient Condition  Indications for airway management: airway protection    Preoxygenated: yes  MILS not maintained throughout  Mask difficulty assessment: 2 - vent by mask + OA or adjuvant +/- NMBA    Final Airway Details  Final airway type: endotracheal airway      Successful airway: ETT  Cuffed: yes   Successful intubation technique: video laryngoscopy  Endotracheal tube insertion site: oral  Blade: Steven  Blade size: 3  ETT size (mm): 7.0  Cormack-Lehane Classification: grade I - full view of glottis  Placement verified by: chest auscultation and capnometry   Measured from: lips  ETT/EBT  to lips (cm): 20  Number of attempts at approach: 1  Assessment: lips, teeth, and gum same as pre-op and atraumatic intubation    Additional Comments  Negative epigastric sounds, Breath sound equal bilaterally with symmetric chest rise and fall

## 2023-08-22 NOTE — PLAN OF CARE
Patient alert and oriented. Covaderm to back incision with teri drain. Ambulated to bathroom with assist x 1, voiding wnl. Prn pain pill given.

## 2023-08-23 LAB
GLUCOSE BLDC GLUCOMTR-MCNC: 132 MG/DL (ref 70–130)
GLUCOSE BLDC GLUCOMTR-MCNC: 154 MG/DL (ref 70–130)
GLUCOSE BLDC GLUCOMTR-MCNC: 163 MG/DL (ref 70–130)
GLUCOSE BLDC GLUCOMTR-MCNC: 164 MG/DL (ref 70–130)
GLUCOSE BLDC GLUCOMTR-MCNC: 223 MG/DL (ref 70–130)
HCT VFR BLD AUTO: 40.1 % (ref 34–46.6)
HGB BLD-MCNC: 13 G/DL (ref 12–15.9)

## 2023-08-23 PROCEDURE — 63710000001 CITALOPRAM 20 MG TABLET: Performed by: NEUROLOGICAL SURGERY

## 2023-08-23 PROCEDURE — A9270 NON-COVERED ITEM OR SERVICE: HCPCS | Performed by: NEUROLOGICAL SURGERY

## 2023-08-23 PROCEDURE — 63710000001 EMPAGLIFLOZIN 10 MG TABLET: Performed by: NEUROLOGICAL SURGERY

## 2023-08-23 PROCEDURE — 97110 THERAPEUTIC EXERCISES: CPT

## 2023-08-23 PROCEDURE — 63710000001 OXYCODONE-ACETAMINOPHEN 10-325 MG TABLET: Performed by: NEUROLOGICAL SURGERY

## 2023-08-23 PROCEDURE — 63710000001 VALSARTAN 160 MG TABLET: Performed by: NEUROLOGICAL SURGERY

## 2023-08-23 PROCEDURE — 85018 HEMOGLOBIN: CPT | Performed by: NEUROLOGICAL SURGERY

## 2023-08-23 PROCEDURE — 97165 OT EVAL LOW COMPLEX 30 MIN: CPT

## 2023-08-23 PROCEDURE — 63710000001 HYDROCHLOROTHIAZIDE 25 MG TABLET: Performed by: NEUROLOGICAL SURGERY

## 2023-08-23 PROCEDURE — 63710000001 TIZANIDINE 4 MG TABLET: Performed by: NEUROLOGICAL SURGERY

## 2023-08-23 PROCEDURE — 63710000001 LAMOTRIGINE 100 MG TABLET: Performed by: NEUROLOGICAL SURGERY

## 2023-08-23 PROCEDURE — 63710000001 NIFEDIPINE XL 30 MG TABLET SUSTAINED-RELEASE 24 HOUR: Performed by: NEUROLOGICAL SURGERY

## 2023-08-23 PROCEDURE — 82948 REAGENT STRIP/BLOOD GLUCOSE: CPT

## 2023-08-23 PROCEDURE — 63710000001 ATORVASTATIN 40 MG TABLET: Performed by: NEUROLOGICAL SURGERY

## 2023-08-23 PROCEDURE — 97161 PT EVAL LOW COMPLEX 20 MIN: CPT

## 2023-08-23 PROCEDURE — 63710000001 ASPIRIN 81 MG TABLET DELAYED-RELEASE: Performed by: NEUROLOGICAL SURGERY

## 2023-08-23 PROCEDURE — 25010000002 CEFAZOLIN IN DEXTROSE 2-4 GM/100ML-% SOLUTION: Performed by: NEUROLOGICAL SURGERY

## 2023-08-23 PROCEDURE — 99024 POSTOP FOLLOW-UP VISIT: CPT

## 2023-08-23 PROCEDURE — 63710000001 GABAPENTIN 400 MG CAPSULE: Performed by: NEUROLOGICAL SURGERY

## 2023-08-23 PROCEDURE — 85014 HEMATOCRIT: CPT | Performed by: NEUROLOGICAL SURGERY

## 2023-08-23 PROCEDURE — 97535 SELF CARE MNGMENT TRAINING: CPT

## 2023-08-23 PROCEDURE — 63710000001 METHOCARBAMOL 500 MG TABLET: Performed by: NEUROLOGICAL SURGERY

## 2023-08-23 RX ORDER — OXYCODONE AND ACETAMINOPHEN 10; 325 MG/1; MG/1
1 TABLET ORAL EVERY 6 HOURS PRN
Qty: 20 TABLET | Refills: 0 | Status: SHIPPED | OUTPATIENT
Start: 2023-08-23

## 2023-08-23 RX ADMIN — PANTOPRAZOLE SODIUM 40 MG: 40 TABLET, DELAYED RELEASE ORAL at 05:35

## 2023-08-23 RX ADMIN — VALSARTAN 320 MG: 160 TABLET, FILM COATED ORAL at 08:31

## 2023-08-23 RX ADMIN — METHOCARBAMOL 1000 MG: 500 TABLET ORAL at 17:53

## 2023-08-23 RX ADMIN — OXYCODONE HYDROCHLORIDE AND ACETAMINOPHEN 1 TABLET: 10; 325 TABLET ORAL at 09:47

## 2023-08-23 RX ADMIN — NIFEDIPINE 30 MG: 30 TABLET, EXTENDED RELEASE ORAL at 08:28

## 2023-08-23 RX ADMIN — OXYCODONE HYDROCHLORIDE AND ACETAMINOPHEN 1 TABLET: 10; 325 TABLET ORAL at 06:12

## 2023-08-23 RX ADMIN — TIZANIDINE 6 MG: 4 TABLET ORAL at 15:45

## 2023-08-23 RX ADMIN — METHOCARBAMOL 1000 MG: 500 TABLET ORAL at 09:50

## 2023-08-23 RX ADMIN — EMPAGLIFLOZIN 10 MG: 10 TABLET, FILM COATED ORAL at 08:31

## 2023-08-23 RX ADMIN — TIZANIDINE 6 MG: 4 TABLET ORAL at 20:07

## 2023-08-23 RX ADMIN — ASPIRIN 81 MG: 81 TABLET, COATED ORAL at 08:30

## 2023-08-23 RX ADMIN — HYDROCHLOROTHIAZIDE 25 MG: 25 TABLET ORAL at 08:30

## 2023-08-23 RX ADMIN — METHOCARBAMOL 1000 MG: 500 TABLET ORAL at 02:39

## 2023-08-23 RX ADMIN — LAMOTRIGINE 100 MG: 100 TABLET ORAL at 08:28

## 2023-08-23 RX ADMIN — ATORVASTATIN CALCIUM 80 MG: 40 TABLET, FILM COATED ORAL at 20:07

## 2023-08-23 RX ADMIN — TIZANIDINE 6 MG: 4 TABLET ORAL at 08:30

## 2023-08-23 RX ADMIN — GABAPENTIN 400 MG: 400 CAPSULE ORAL at 15:45

## 2023-08-23 RX ADMIN — CEFAZOLIN SODIUM 2 G: 2 INJECTION, SOLUTION INTRAVENOUS at 02:38

## 2023-08-23 RX ADMIN — OXYCODONE HYDROCHLORIDE AND ACETAMINOPHEN 1 TABLET: 10; 325 TABLET ORAL at 13:59

## 2023-08-23 RX ADMIN — GABAPENTIN 400 MG: 400 CAPSULE ORAL at 08:31

## 2023-08-23 RX ADMIN — OXYCODONE HYDROCHLORIDE AND ACETAMINOPHEN 1 TABLET: 5; 325 TABLET ORAL at 02:38

## 2023-08-23 RX ADMIN — LAMOTRIGINE 100 MG: 100 TABLET ORAL at 20:07

## 2023-08-23 RX ADMIN — CITALOPRAM HYDROBROMIDE 20 MG: 20 TABLET ORAL at 08:31

## 2023-08-23 RX ADMIN — GABAPENTIN 400 MG: 400 CAPSULE ORAL at 20:07

## 2023-08-23 RX ADMIN — OXYCODONE HYDROCHLORIDE AND ACETAMINOPHEN 1 TABLET: 10; 325 TABLET ORAL at 17:53

## 2023-08-23 NOTE — CASE MANAGEMENT/SOCIAL WORK
Discharge Planning Assessment  [facility]     Patient Name: Vibha Saavedra  MRN: [id number]  Today's Date: 8/23/2023    Admit Date: 8/22/2023    Plan: Home with spouse's assistance, if needed   Discharge Needs Assessment       Row Name 08/23/23 1148       Living Environment    People in Home child(ethel), adult;spouse    Name(s) of People in Home MEG SAAVEDRA Spouse [phone]    Current Living Arrangements apartment    Potentially Unsafe Housing Conditions none    Primary Care Provided by self    Provides Primary Care For no one    Family Caregiver if Needed child(ethel), adult;spouse    Family Caregiver Names MEG SAAVEDRA Spouse [phone]    Quality of Family Relationships helpful;involved;supportive    Able to Return to Prior Arrangements yes       Resource/Environmental Concerns    Resource/Environmental Concerns none    Transportation Concerns none       Food Insecurity    Within the past 12 months, you worried that your food would run out before you got the money to buy more. Never true    Within the past 12 months, the food you bought just didn't last and you didn't have money to get more. Never true       Transition Planning    Patient/Family Anticipates Transition to home with family    Patient/Family Anticipated Services at Transition none    Transportation Anticipated family or friend will provide       Discharge Needs Assessment    Readmission Within the Last 30 Days no previous admission in last 30 days    Equipment Currently Used at Home cane, straight    Concerns to be Addressed denies needs/concerns at this time    Equipment Needed After Discharge walker, rolling                   Discharge Plan       Row Name 08/23/23 1149       Plan    Plan Home with spouse's assistance, if needed    Patient/Family in Agreement with Plan yes    Plan Comments CM spoke with patient at bedside regarding DC planning. Patient resides in [facility] with her spouse and adult son. Patient is independent with  ADL's, denies any DME except for a cane. Patient denies any current home health or outpatient services. Patient has medical insurance, prescription coverage and is able to afford/obtain medications without difficulty. Patient has no advanced directives. Patient denies any discharge planning needs. Plan is home. Patient in need of a rolling walker for bedside. [employer] will deliver today. Family to provide transportation to home. CM will continue to follow    Final Discharge Disposition Code 01 - home or self-care                  Continued Care and Services - Admitted Since 8/22/2023       Durable Medical Equipment Coordination complete.      Service Provider Request Status Selected Services Address Phone Fax Patient Preferred    [facility] [place]  Selected Durable Medical Equipment [address] [place] [address] [place], [place] [address] [phone] --                  Expected Discharge Date and Time       Expected Discharge Date Expected Discharge Time    Aug 25, 2023            Demographic Summary       Row Name 08/23/23 1142       General Information    Arrived From home    Referral Source emergency department    Reason for Consult discharge planning    Preferred Language English       Contact Information    Contact Information Comments MEG SAAVEDRA Spouse [phone]                   Functional Status       Row Name 08/23/23 1143       Functional Status    Usual Activity Tolerance good    Current Activity Tolerance --  See PT notes       Physical Activity    On average, how many days per week do you engage in moderate to strenuous exercise (like a brisk walk)? 0 days    On average, how many minutes do you engage in exercise at this level? 0 min    Number of minutes of exercise per week 0       Assessment of Health Literacy    How often do you have someone help you read hospital materials? Never    How often do you have problems learning about your medical condition because of difficulty understanding  written information? Never    How often do you have a problem understanding what is told to you about your medical condition? Never    How confident are you filling out medical forms by yourself? Quite a bit    Health Literacy Good       Functional Status, IADL    Medications independent    Meal Preparation independent    Housekeeping independent    Laundry independent    Shopping independent       Mental Status    General Appearance WDL WDL       Mental Status Summary    Recent Changes in Mental Status/Cognitive Functioning no changes       Employment/    Employment Status retired                   Psychosocial    No documentation.                  Abuse/Neglect    No documentation.                  Legal    No documentation.                  Substance Abuse    No documentation.                  Patient Forms    No documentation.                     Nicky Potter RN

## 2023-08-23 NOTE — THERAPY EVALUATION
Patient Name: Vibha Mcdowell  : 1955    MRN: [id number]                              Today's Date: 2023       Admit Date: 2023    Visit Dx:     ICD-10-CM ICD-9-CM   1. S/P lumbar fusion  Z98.1 V45.4   2. Stenosis of lateral recess of lumbar spine  M48.061 724.02   3. Lumbosacral radiculopathy at L5  M54.17 724.4     Patient Active Problem List   Diagnosis    Arthritis    Low back pain    DDD (degenerative disc disease), lumbar    Spondylosis of lumbar spine    Lumbosacral radiculopathy at L5    History of back surgery    Pain of left hip    Lumbar spondylosis    Stenosis of lateral recess of lumbar spine    S/P lumbar fusion    Type 2 diabetes mellitus, without long-term current use of insulin    Coronary artery disease involving coronary bypass graft of native heart without angina pectoris    Asthma     Past Medical History:   Diagnosis Date    Anemia     Arthritis     Asthma     Bronchitis     Diabetes mellitus     Headache     Heart attack     Heart disease     History of transfusion     Hyperlipidemia     Hypertension     Low back pain     Lumbosacral disc disease     Pneumonia     Thyroid disease      Past Surgical History:   Procedure Laterality Date    BACK SURGERY      CORONARY ARTERY BYPASS GRAFT      HYSTERECTOMY      INTERVENTIONAL RADIOLOGY PROCEDURE N/A 2023    Procedure: IR myelogram lumbar with fluoroscopy;  Surgeon: Aaron Cordoba MD;  Location: [facility] INVASIVE LOCATION;  Service: Interventional Radiology;  Laterality: N/A;    THYROIDECTOMY        General Information       Row Name 23 0944          OT Time and Intention    Document Type evaluation;therapy note (daily note)  -TB     Mode of Treatment occupational therapy;individual therapy  -TB       Row Name 23 0944          General Information    Patient Profile Reviewed yes  -TB     Prior Level of Function independent:;all household mobility;community mobility;transfer;bed  mobility;ADL's;driving;shopping;home management  -TB     Existing Precautions/Restrictions fall;spinal;other (see comments)  GHADA drain  -TB     Barriers to Rehab none identified  -TB       Row Name 08/23/23 0944          Occupational Profile    Reason for Services/Referral (Occupational Profile) Occupational decline  -TB     Environmental Supports and Barriers (Occupational Profile) Pt lives with her spouse and son in a single story home with 1 step to enter. Tub/shower with grab bars. Comfort ht commodes. Independent with BADLs/IADLs prior. No AD. No h/o falls.  -TB       Row Name 08/23/23 0944          Living Environment    People in Home spouse  -TB       Row Name 08/23/23 0944          Home Main Entrance    Number of Stairs, Main Entrance one  -TB     Stair Railings, Main Entrance none  -TB       Row Name 08/23/23 0944          Stairs Within Home, Primary    Number of Stairs, Within Home, Primary none  -TB       Row Name 08/23/23 0944          Cognition    Orientation Status (Cognition) oriented x 4  -TB       Row Name 08/23/23 0944          Safety Issues, Functional Mobility    Safety Issues Affecting Function (Mobility) insight into deficits/self-awareness;safety precaution awareness  New learning deficits only  -TB     Impairments Affecting Function (Mobility) pain;strength;range of motion (ROM)  -TB     Comment, Safety Issues/Impairments (Mobility) Pt up in room/bathroom with RW support and CGAx1.  -TB               User Key  (r) = Recorded By, (t) = Taken By, (c) = Cosigned By      Initials Name Provider Type    TB Estelle Dickey OT Occupational Therapist                     Mobility/ADL's       Row Name 08/23/23 0952          Bed Mobility    Bed Mobility rolling right;sidelying-sit  -TB     Rolling Right [place] (Bed Mobility) contact guard;1 person assist;verbal cues  -TB     Sidelying-Sit [place] (Bed Mobility) minimum assist (75% patient effort);1 person assist;verbal cues  -TB      Assistive Device (Bed Mobility) bed rails  -TB     Comment, (Bed Mobility) Education initiated for spinal precautions and logroll sequencing. Pt demonstrates good understanding.  -TB       Row Name 08/23/23 0952          Transfers    Transfers sit-stand transfer;toilet transfer;bed-chair transfer  -TB     Comment, (Transfers) Education and cues for precautions, hand placement, and sequencing all transfers.  -TB       Row Name 08/23/23 0952          Bed-Chair Transfer    Bed-Chair [place] (Transfers) contact guard;1 person assist;verbal cues  -TB     Assistive Device (Bed-Chair Transfers) walker, front-wheeled  -TB       Row Name 08/23/23 0952          Sit-Stand Transfer    Sit-Stand [place] (Transfers) contact guard;1 person assist;verbal cues  -TB     Assistive Device (Sit-Stand Transfers) walker, front-wheeled  -TB       Row Name 08/23/23 0952          Toilet Transfer    Type (Toilet Transfer) sit-stand;stand-sit  -TB     [place] Level (Toilet Transfer) contact guard;1 person assist;verbal cues  -TB     Assistive Device (Toilet Transfer) raised toilet seat;grab bars/safety frame;walker, front-wheeled  -TB       Row Name 08/23/23 0952          Functional Mobility    Functional Mobility- Ind. Level contact guard assist;1 person;verbal cues required  -TB     Functional Mobility- Device walker, front-wheeled  -TB     Functional Mobility-Distance (Feet) 75  -TB     Functional Mobility- Safety Issues balance decreased during turns  -TB     Functional Mobility- Comment Good effort. No LOB.  -TB       Row Name 08/23/23 0952          Activities of Daily Living    BADL Assessment/Intervention bathing;upper body dressing;lower body dressing;grooming;feeding;toileting  -TB       Row Name 08/23/23 0952          Bathing Assessment/Intervention    [place] Level (Bathing) set up;distal lower extremities/feet  -TB     Position (Bathing) unsupported sitting  -TB     Comment, (Bathing) Education/demonstration  initiated for spinal precautions and ADL retraining.  -TB       Row Name 08/23/23 0952          Upper Body Dressing Assessment/Training    [place] Level (Upper Body Dressing) don;pajama/robe;set up  -TB     Position (Upper Body Dressing) edge of bed sitting  -TB       Row Name 08/23/23 0952          Lower Body Dressing Assessment/Training    [place] Level (Lower Body Dressing) doff;don;socks;moderate assist (50% patient effort);verbal cues  -TB     Position (Lower Body Dressing) unsupported sitting  -TB     Comment, (Lower Body Dressing) Education/demonstration initiated for spinal precautions and ADL retraining.  -TB       Row Name 08/23/23 0952          Grooming Assessment/Training    [place] Level (Grooming) set up;wash face, hands  -TB     Position (Grooming) sink side  -TB       Row Name 08/23/23 0952          Self-Feeding Assessment/Training    [place] Level (Feeding) independent;scoop food and bring to mouth;liquids to mouth  -TB     Position (Self-Feeding) sitting up in bed  -TB       Row Name 08/23/23 0952          Toileting Assessment/Training    [place] Level (Toileting) contact guard assist;adjust/manage clothing;independent;perform perineal hygiene  -TB     Position (Toileting) supported standing;unsupported sitting  -TB               User Key  (r) = Recorded By, (t) = Taken By, (c) = Cosigned By      Initials Name Provider Type     Estelle Dickey, OT Occupational Therapist                   Obj/Interventions       Row Name 08/23/23 0957          Sensory Assessment (Somatosensory)    Sensory Assessment (Somatosensory) UE sensation intact  -TB       Row Name 08/23/23 0957          Vision Assessment/Intervention    Visual Impairment/Limitations corrective lenses full-time  -TB       Row Name 08/23/23 0957          Range of Motion Comprehensive    General Range of Motion bilateral upper extremity ROM WFL  -TB     Comment, General Range of Motion BUE AROM WFL for self-care   -TB       Row Name 08/23/23 0957          Strength Comprehensive (MMT)    Comment, General Manual Muscle Testing (MMT) Assessment Generalized weakness. No focal deficits. Pt is active at baseline.  -TB       Row Name 08/23/23 0957          Balance    Balance Assessment sitting dynamic balance;sit to stand dynamic balance;standing dynamic balance  -TB     Dynamic Sitting Balance supervision  -TB     Position, Sitting Balance unsupported;sitting in chair;sitting edge of bed  commode  -TB     Sit to Stand Dynamic Balance contact guard;1-person assist;verbal cues  -TB     Dynamic Standing Balance contact guard;1-person assist;verbal cues  -TB     Position/Device Used, Standing Balance supported;walker, front-wheeled  -TB     Balance Interventions sitting;standing;sit to stand;supported;dynamic;dynamic reaching;occupation based/functional task;UE activity with balance activity  -TB               User Key  (r) = Recorded By, (t) = Taken By, (c) = Cosigned By      Initials Name Provider Type    TB Estelle Dickey, OT Occupational Therapist                   Goals/Plan       Row Name 08/23/23 1004          Bed Mobility Goal 1 (OT)    Activity/Assistive Device (Bed Mobility Goal 1, OT) sidelying to sit/sit to sidelying  -TB     [place] Level/Cues Needed (Bed Mobility Goal 1, OT) tactile cues required;verbal cues required;contact guard required  -TB     Time Frame (Bed Mobility Goal 1, OT) by discharge  -TB     Strategies/Barriers (Bed Mobility Goal 1, OT) Spinal precautions  -TB     Progress/Outcomes (Bed Mobility Goal 1, OT) goal ongoing  -TB       Row Name 08/23/23 1004          Transfer Goal 1 (OT)    Activity/Assistive Device (Transfer Goal 1, OT) toilet  -TB     [place] Level/Cues Needed (Transfer Goal 1, OT) standby assist;verbal cues required  -TB     Time Frame (Transfer Goal 1, OT) by discharge  -TB     Progress/Outcome (Transfer Goal 1, OT) goal ongoing  -TB       Row Name 08/23/23 1004           Dressing Goal 1 (OT)    Activity/Device (Dressing Goal 1, OT) lower body dressing  -TB     [place]/Cues Needed (Dressing Goal 1, OT) minimum assist (75% or more patient effort);verbal cues required  -TB     Time Frame (Dressing Goal 1, OT) by discharge  -TB     Strategies/Barriers (Dressing Goal 1, OT) Spinal precautions  -TB     Progress/Outcome (Dressing Goal 1, OT) goal ongoing  -TB               User Key  (r) = Recorded By, (t) = Taken By, (c) = Cosigned By      Initials Name Provider Type    TB Estelle Dickey, OT Occupational Therapist                   Clinical Impression       Row Name 08/23/23 0958          Pain Assessment    Pain Intervention(s) Ambulation/increased activity;Repositioned  -TB     Additional Documentation Pain Scale: FACES Pre/Post-Treatment (Group)  -TB       Row Name 08/23/23 0958          Pain Scale: FACES Pre/Post-Treatment    Pain: FACES Scale, Pretreatment 4-->hurts little more  -TB     Posttreatment Pain Rating 6-->hurts even more  -TB     Pain Location - Side/Orientation Bilateral  -TB     Pain Location lower  -TB     Pain Location - back  -TB     Pre/Posttreatment Pain Comment Pt tolerates dynamic EOB/OOB activity well  -TB       Row Name 08/23/23 0958          Plan of Care Review    Plan of Care Reviewed With patient  -TB     Progress --  IE  -TB     Outcome Evaluation OT IE completed. Pt is A/Ox4 and motivated to work with therapy. Education/demonstration initiated for spinal precautions, logroll sequencing, and ADL retraining. Min A sidelying to sit. Up in room/bathroom with RW support and CGAx1. Mod A LB dressing. OT will follow IP. Recommend home with spouse/son assist when medically ready. No DME needs at this time.  -TB       Row Name 08/23/23 0958          Therapy Assessment/Plan (OT)    Rehab Potential (OT) good, to achieve stated therapy goals  -TB     Criteria for Skilled Therapeutic Interventions Met (OT) yes;meets criteria;skilled treatment is necessary   -TB     Therapy Frequency (OT) daily  -TB       Row Name 08/23/23 0958          Therapy Plan Review/Discharge Plan (OT)    Anticipated Discharge Disposition (OT) home with assist  -TB       Row Name 08/23/23 0958          Vital Signs    Pre Systolic BP Rehab --  RN cleared OT  -TB     Pre SpO2 (%) 96  2L  -TB     O2 Delivery Pre Treatment nasal cannula  -TB     Intra SpO2 (%) 88  -TB     O2 Delivery Intra Treatment room air  -TB     Post SpO2 (%) 94  1L  -TB     O2 Delivery Post Treatment nasal cannula  -TB     Pre Patient Position Supine  -TB     Intra Patient Position Standing  -TB     Post Patient Position Sitting  -TB       Row Name 08/23/23 0958          Positioning and Restraints    Pre-Treatment Position in bed  -TB     Post Treatment Position chair  -TB     In Chair notified nsg;reclined;call light within reach;encouraged to call for assist;exit alarm on;waffle cushion;legs elevated  -TB               User Key  (r) = Recorded By, (t) = Taken By, (c) = Cosigned By      Initials Name Provider Type    Estelle Mccoy OT Occupational Therapist                   Outcome Measures       Row Name 08/23/23 1005          How much help from another is currently needed...    Putting on and taking off regular lower body clothing? 2  -TB     Bathing (including washing, rinsing, and drying) 3  -TB     Toileting (which includes using toilet bed pan or urinal) 3  -TB     Putting on and taking off regular upper body clothing 3  -TB     Taking care of personal grooming (such as brushing teeth) 3  -TB     Eating meals 4  -TB     AM-PAC 6 Clicks Score (OT) 18  -TB       Row Name 08/23/23 1005          Functional Assessment    Outcome Measure Options AM-PAC 6 Clicks Daily Activity (OT)  -TB               User Key  (r) = Recorded By, (t) = Taken By, (c) = Cosigned By      Initials Name Provider Type    Estelle Mccoy OT Occupational Therapist                    Occupational Therapy Education       Title: PT  OT SLP Therapies (In Progress)       Topic: Occupational Therapy (In Progress)       Point: ADL training (Done)       Description:   Instruct learner(s) on proper safety adaptation and remediation techniques during self care or transfers.   Instruct in proper use of assistive devices.                  Learning Progress Summary             Patient Acceptance, E,D, VU,DU,NR by  at 8/23/2023 1006                         Point: Home exercise program (Not Started)       Description:   Instruct learner(s) on appropriate technique for monitoring, assisting and/or progressing therapeutic exercises/activities.                  Learner Progress:  Not documented in this visit.              Point: Precautions (Done)       Description:   Instruct learner(s) on prescribed precautions during self-care and functional transfers.                  Learning Progress Summary             Patient Acceptance, E,D, VU,DU,NR by  at 8/23/2023 1006                         Point: Body mechanics (Not Started)       Description:   Instruct learner(s) on proper positioning and spine alignment during self-care, functional mobility activities and/or exercises.                  Learner Progress:  Not documented in this visit.                              User Key       Initials Effective Dates Name Provider Type Discipline     07/11/23 -  Estelle Dickey OT Occupational Therapist OT                  OT Recommendation and Plan  Therapy Frequency (OT): daily  Plan of Care Review  Plan of Care Reviewed With: patient  Progress:  (IE)  Outcome Evaluation: OT IE completed. Pt is A/Ox4 and motivated to work with therapy. Education/demonstration initiated for spinal precautions, logroll sequencing, and ADL retraining. Min A sidelying to sit. Up in room/bathroom with RW support and CGAx1. Mod A LB dressing. OT will follow IP. Recommend home with spouse/son assist when medically ready. No DME needs at this time.     Time Calculation:   Evaluation  Complexity (OT)  Review Occupational Profile/Medical/Therapy History Complexity: brief/low complexity  Assessment, Occupational Performance/Identification of Deficit Complexity: 1-3 performance deficits  Clinical Decision Making Complexity (OT): problem focused assessment/low complexity  Overall Complexity of Evaluation (OT): low complexity     Time Calculation- OT       Row Name 08/23/23 0830             Time Calculation- OT    OT Start Time 0830  -TB      OT Received On 08/23/23  -TB      OT Goal Re-Cert Due Date 09/02/23  -TB         Timed Charges    60122 - OT Self Care/Mgmt Minutes 20  -TB         Untimed Charges    OT Eval/Re-eval Minutes 40  -TB         Total Minutes    Timed Charges Total Minutes 20  -TB      Untimed Charges Total Minutes 40  -TB       Total Minutes 60  -TB                User Key  (r) = Recorded By, (t) = Taken By, (c) = Cosigned By      Initials Name Provider Type    TB Estelle Dickey, OT Occupational Therapist                  Therapy Charges for Today       Code Description Service Date Service Provider Modifiers Qty    15661089201 HC OT SELF CARE/MGMT/TRAIN EA 15 MIN 8/23/2023 Estelle Dickey OT GO 1    06476912842 HC OT EVAL LOW COMPLEXITY 3 8/23/2023 Estelle Dickey, OT GO 1                 Estelle Dickey OT  8/23/2023

## 2023-08-23 NOTE — PLAN OF CARE
Goal Outcome Evaluation:  Plan of Care Reviewed With: patient        Progress: no change  Outcome Evaluation: Pt amb in oliva with FWW and CGA. No knee buckling or LOB noted. Activity limited by fatigue especially in LLE. HEP and precautions reviewed with pt. Recommend d/c home with assist when medically appropriate. Pt cleared to d/c today from PT standpoint.      Anticipated Discharge Disposition (PT): home with assist

## 2023-08-23 NOTE — PLAN OF CARE
Problem: Adult Inpatient Plan of Care  Goal: Plan of Care Review  Recent Flowsheet Documentation  Taken 8/23/2023 0958 by Estelle Dickey OT  Progress: (IE) --  Plan of Care Reviewed With: patient  Outcome Evaluation: OT IE completed. Pt is A/Ox4 and motivated to work with therapy. Education/demonstration initiated for spinal precautions, logroll sequencing, and ADL retraining. Min A sidelying to sit. Up in room/bathroom with RW support and CGAx1. Mod A LB dressing. OT will follow IP. Recommend home with spouse/son assist when medically ready. No DME needs at this time.

## 2023-08-23 NOTE — PROGRESS NOTES
Chief complaint: Left leg pain, L5 radiculopathy    Admit Diagnosis:  Stenosis of lateral recess of lumbar spine [M48.061]  Lumbosacral radiculopathy at L5 [M54.17]  S/P lumbar fusion [Z98.1]  Lumbar spondylosis [M47.816]       Progress Summary:   Post-op day 1 after an L4-5 posterior fusion with decompression and exploration of prior L5-S1 fusion, use of bone morphogenic protein.  Previous 24 hr course: She reports that the pain in her left leg has been greatly reduced immediately after the surgery.  The pain was on the lateral left and went all the way into the lateral half of the foot.  Nutrition and diet impression.  Patient was eating breakfast  The patient has not yet had a bowel movement after surgery, but she is in no acute distress.  We would like to see her GI tract function intact before discharge.  She is performing incentive spirometry and understand its importance.    History of present illness:  This is a 68-year-old female who has been been seen in the office several times.  Over this course she has had several exacerbations of pain in the lower back and left leg, mostly on the outer portion and into the foot in an L5 pattern.  She was seen on 8/14/2023 to review myelogram and other imaging studies.  She was scheduled for surgery which was performed on 8/22/2023.  Subjective improvement as reported above.      ROS:  A 12 point review of systems was performed.  All systems reviewed were negative with the exception of those mentioned in the history of present illness.    Past medical history:  Past Medical History:   Diagnosis Date    Anemia     Arthritis     Asthma     Bronchitis     Diabetes mellitus     Headache     Heart attack     Heart disease     History of transfusion     Hyperlipidemia     Hypertension     Low back pain     Lumbosacral disc disease     Pneumonia     Thyroid disease        Past surgical history:  Past Surgical History:   Procedure Laterality Date    BACK SURGERY      CORONARY  ARTERY BYPASS GRAFT      HYSTERECTOMY  1985    INTERVENTIONAL RADIOLOGY PROCEDURE N/A 7/19/2023    Procedure: IR myelogram lumbar with fluoroscopy;  Surgeon: Aaron Cordoba MD;  Location: [facility] INVASIVE LOCATION;  Service: Interventional Radiology;  Laterality: N/A;    THYROIDECTOMY         Social history:  Social History     Socioeconomic History    Marital status:    Tobacco Use    Smoking status: Former     Packs/day: 1.00     Types: Cigarettes    Smokeless tobacco: Never   Substance and Sexual Activity    Alcohol use: Never    Drug use: Never    Sexual activity: Defer       Family history:  Family History   Problem Relation Age of Onset    Arthritis Mother     Cancer Mother     Heart disease Mother     Hypertension Mother     Arthritis Father     Diabetes Sister     Hypertension Sister        Allergies:  Allergies   Allergen Reactions    Carvedilol Shortness Of Breath     cough    Augmentin [Amoxicillin-Pot Clavulanate] Diarrhea    Gatifloxacin Delirium and Nausea And Vomiting    Morphine Nausea And Vomiting       Outpatient medications:  Scheduled Meds:aspirin, 81 mg, Oral, Daily  atorvastatin, 80 mg, Oral, Nightly  citalopram, 20 mg, Oral, Daily  empagliflozin, 10 mg, Oral, Daily  gabapentin, 400 mg, Oral, TID  valsartan, 320 mg, Oral, Q24H   And  hydroCHLOROthiazide, 25 mg, Oral, Q24H  lamoTRIgine, 100 mg, Oral, BID  NIFEdipine XL, 30 mg, Oral, Daily  pantoprazole, 40 mg, Oral, Q AM  tiZANidine, 6 mg, Oral, TID      Continuous Infusions:sodium chloride 0.45 % with KCl 20 mEq, 100 mL/hr, Last Rate: 100 mL/hr (08/22/23 1818)      PRN Meds:.  acetaminophen    albuterol sulfate HFA    ALPRAZolam    bisacodyl    ibuprofen    methocarbamol    ondansetron **OR** ondansetron    oxyCODONE-acetaminophen    oxyCODONE-acetaminophen    polyethylene glycol    senna-docusate sodium    Physical Examination:    Vitals:    08/23/23 0828   BP: 159/60   Pulse: 70   Resp:    Temp:    SpO2:      Systolic (24hrs),  Av , Min:83 , Max:206     Diastolic (24hrs), Av, Min:44, Max:86    Pulse  Av.3  Min: 53  Max: 70  Temp (24hrs), Av.6 øF (36.4 øC), Min:97.1 øF (36.2 øC), Max:98.4 øF (36.9 øC)          The Surgical site is clean and dry with no signs of erythema or infection. Drain output was 100 mL yesterday.  There were 25 mL of serosanguineous fluid in the GHADA drain today on inspection.    General: The patient is pleasant and conversational, and is not in acute distress.   Neurological:  Facial expressions are equal bilaterally.     Eyes: Extraocular eye movements are intact, and pupils are equal, round, and reactive to light.  Musculoskeletal:   Upper extremity strength is 5/5  Lower extremity strength is 5/5  It should be noted that the patient continues to complain of some leg weakness and difficulty walking, this is noticeable in hip flexion but is not extreme.  Sensation: Reports some pain numbness/tingling on the left side of her leg in an L5 distribution.  This has greatly improved immediately after surgery.      HEENT: Normocephalic, atraumatic.   Cardiovascular: Regular rate and rhythm.  No edema noted  Pulmonary: Normal respiratory effort  Abdomen: Soft  Psychiatric: Normal mood and affect      Imaging:  There is no new imaging to review.  Images taken during and after the procedure showed good hardware placement and intervertebral spacing.      Assessment and Plan:    Neurosurgery Focused Problems:    Postop L4-5 PLIF    -Measure GHADA drain output, plan to remove the drain later today if only small amount of output.  - Consult with PT/OT on their assessment of her ability to ambulate and perform activities of daily living.  If they are in concordance, neurosurgery would like to discharge this patient home to continue her recovery there.   -Follow-up in office in 2 weeks for wound inspection.  Superficial closure was with skin glue, there are no external sutures.

## 2023-08-23 NOTE — THERAPY EVALUATION
Patient Name: Vibha Mcdowell  : 1955    MRN: [id number]                              Today's Date: 2023       Admit Date: 2023    Visit Dx:     ICD-10-CM ICD-9-CM   1. S/P lumbar fusion  Z98.1 V45.4   2. Stenosis of lateral recess of lumbar spine  M48.061 724.02   3. Lumbosacral radiculopathy at L5  M54.17 724.4     Patient Active Problem List   Diagnosis    Arthritis    Low back pain    DDD (degenerative disc disease), lumbar    Spondylosis of lumbar spine    Lumbosacral radiculopathy at L5    History of back surgery    Pain of left hip    Lumbar spondylosis    Stenosis of lateral recess of lumbar spine    S/P lumbar fusion    Type 2 diabetes mellitus, without long-term current use of insulin    Coronary artery disease involving coronary bypass graft of native heart without angina pectoris    Asthma     Past Medical History:   Diagnosis Date    Anemia     Arthritis     Asthma     Bronchitis     Diabetes mellitus     Headache     Heart attack     Heart disease     History of transfusion     Hyperlipidemia     Hypertension     Low back pain     Lumbosacral disc disease     Pneumonia     Thyroid disease      Past Surgical History:   Procedure Laterality Date    BACK SURGERY      CORONARY ARTERY BYPASS GRAFT      HYSTERECTOMY      INTERVENTIONAL RADIOLOGY PROCEDURE N/A 2023    Procedure: IR myelogram lumbar with fluoroscopy;  Surgeon: Aaron Cordoba MD;  Location: [facility] INVASIVE LOCATION;  Service: Interventional Radiology;  Laterality: N/A;    THYROIDECTOMY        General Information       Row Name 23 1044          Physical Therapy Time and Intention    Document Type evaluation  -     Mode of Treatment physical therapy  -       Row Name 23 1044          General Information    Patient Profile Reviewed yes  -HP     Prior Level of Function independent:;all household mobility;community mobility;gait;transfer;min assist:;ADL's  used SPC at baseline  -HP     Existing  Precautions/Restrictions fall;spinal;other (see comments)  GHADA drain  -       Row Name 08/23/23 1044          Living Environment    People in Home spouse;child(ethel), adult  -       Row Name 08/23/23 1044          Home Main Entrance    Number of Stairs, Main Entrance two;other (see comments)  small steps  -       Row Name 08/23/23 1044          Stairs Within Home, Primary    Number of Stairs, Within Home, Primary none  -       Row Name 08/23/23 1044          Cognition    Orientation Status (Cognition) oriented x 4  -       Row Name 08/23/23 1044          Safety Issues, Functional Mobility    Safety Issues Affecting Function (Mobility) insight into deficits/self-awareness;awareness of need for assistance;safety precaution awareness  -     Impairments Affecting Function (Mobility) pain;strength;range of motion (ROM);balance;endurance/activity tolerance  -               User Key  (r) = Recorded By, (t) = Taken By, (c) = Cosigned By      Initials Name Provider Type     La Flores, DESIREE Physical Therapist                   Mobility       Row Name 08/23/23 1045          Bed Mobility    Comment, (Bed Mobility) pt UIC  -       Row Name 08/23/23 1045          Transfers    Comment, (Transfers) VC for hand placement and maintaining neutral spine  -       Row Name 08/23/23 1045          Sit-Stand Transfer    Sit-Stand [place] (Transfers) contact guard;1 person assist;verbal cues  -     Assistive Device (Sit-Stand Transfers) walker, front-wheeled  -       Row Name 08/23/23 1045          Gait/Stairs (Locomotion)    [place] Level (Gait) contact guard;verbal cues  -     Assistive Device (Gait) walker, front-wheeled  -     Distance in Feet (Gait) 300  -     Deviations/Abnormal Patterns (Gait) bilateral deviations;stride length decreased;weight shifting decreased;gait speed decreased  -     Bilateral Gait Deviations forward flexed posture;heel strike decreased  -     Comment, (Gait/Stairs)  Pt amb in oliva with sep-through gait pattern. VC for upright posture and increased stride length. No knee buckling or LOB noted. Activity limited by fatigue. Mild L foot pain and instability noted. Demonstrated and educated pt on sequencing to navigate steps to enter home. Pt verbalized understanding.  -               User Key  (r) = Recorded By, (t) = Taken By, (c) = Cosigned By      Initials Name Provider Type     La Flores PT Physical Therapist                   Obj/Interventions       Row Name 08/23/23 1047          Range of Motion Comprehensive    General Range of Motion no range of motion deficits identified  -[facility] Name 08/23/23 1047          Strength Comprehensive (MMT)    General Manual Muscle Testing (MMT) Assessment lower extremity strength deficits identified  -     Comment, General Manual Muscle Testing (MMT) Assessment BLE grossly 4/5  -[facility] Name 08/23/23 1047          Motor Skills    Therapeutic Exercise hip;knee;ankle;shoulder  -[facility] Name 08/23/23 1047          Shoulder (Therapeutic Exercise)    Shoulder (Therapeutic Exercise) AROM (active range of motion)  -     Shoulder AROM (Therapeutic Exercise) bilateral;flexion;10 repetitions  -[facility] Name 08/23/23 1047          Hip (Therapeutic Exercise)    Hip (Therapeutic Exercise) strengthening exercise  -     Hip Strengthening (Therapeutic Exercise) bilateral;heel slides;aBduction;external rotation;aDduction;internal rotation;10 repetitions  -[facility] Name 08/23/23 1047          Knee (Therapeutic Exercise)    Knee (Therapeutic Exercise) isometric exercises  -     Knee Isometrics (Therapeutic Exercise) bilateral;gluteal sets;quad sets;other (see comments);10 repetitions  abdominal set  -[facility] Name 08/23/23 1047          Ankle (Therapeutic Exercise)    Ankle (Therapeutic Exercise) AROM (active range of motion)  -     Ankle AROM (Therapeutic Exercise) bilateral;dorsiflexion;plantarflexion;10  repetitions  -       Row Name 08/23/23 1047          Balance    Balance Assessment sitting static balance;sitting dynamic balance;sit to stand dynamic balance;standing static balance;standing dynamic balance  -     Static Sitting Balance standby assist  -     Dynamic Sitting Balance standby assist  -     Position, Sitting Balance sitting in chair  -     Static Standing Balance contact guard  -     Dynamic Standing Balance contact guard  -     Position/Device Used, Standing Balance supported;walker, rolling  -     Balance Interventions sitting;standing;sit to stand;occupation based/functional task  -       Row Name 08/23/23 1047          Sensory Assessment (Somatosensory)    Sensory Assessment (Somatosensory) LE sensation intact  -               User Key  (r) = Recorded By, (t) = Taken By, (c) = Cosigned By      Initials Name Provider Type     La Flores, PT Physical Therapist                   Goals/Plan       Row Name 08/23/23 1049          Bed Mobility Goal 1 (PT)    Activity/Assistive Device (Bed Mobility Goal 1, PT) sit to supine/supine to sit  -     [place] Level/Cues Needed (Bed Mobility Goal 1, PT) modified independence  -HP     Time Frame (Bed Mobility Goal 1, PT) long term goal (LTG);5 days  -HP     Progress/Outcomes (Bed Mobility Goal 1, PT) goal ongoing  -       Row Name 08/23/23 1049          Transfer Goal 1 (PT)    Activity/Assistive Device (Transfer Goal 1, PT) sit-to-stand/stand-to-sit  -     [place] Level/Cues Needed (Transfer Goal 1, PT) modified independence  -HP     Time Frame (Transfer Goal 1, PT) long term goal (LTG);5 days  -HP     Progress/Outcome (Transfer Goal 1, PT) goal ongoing  -       Row Name 08/23/23 1049          Gait Training Goal 1 (PT)    Activity/Assistive Device (Gait Training Goal 1, PT) gait (walking locomotion)  -     [place] Level (Gait Training Goal 1, PT) modified independence  -HP     Distance (Gait Training Goal 1, PT)  500  -HP     Time Frame (Gait Training Goal 1, PT) long term goal (LTG);5 days  -HP     Progress/Outcome (Gait Training Goal 1, PT) goal ongoing  -       Row Name 08/23/23 1049          Stairs Goal 1 (PT)    Activity/Assistive Device (Stairs Goal 1, PT) ascending stairs;descending stairs  -HP     [place] Level/Cues Needed (Stairs Goal 1, PT) contact guard required  -HP     Number of Stairs (Stairs Goal 1, PT) 2  -HP     Time Frame (Stairs Goal 1, PT) long term goal (LTG);5 days  -HP     Progress/Outcome (Stairs Goal 1, PT) goal ongoing  -HP       Row Name 08/23/23 1049          Therapy Assessment/Plan (PT)    Planned Therapy Interventions (PT) balance training;bed mobility training;gait training;home exercise program;transfer training;stretching;patient/family education;strengthening;stair training;ROM (range of motion)  -               User Key  (r) = Recorded By, (t) = Taken By, (c) = Cosigned By      Initials Name Provider Type     La Flores, PT Physical Therapist                   Clinical Impression       Row Name 08/23/23 1048          Pain    Pretreatment Pain Rating 8/10  -HP     Posttreatment Pain Rating 8/10  -HP     Pain Location incisional  -     Pain Location - back  -     Pain Intervention(s) Repositioned;Ambulation/increased activity;Elevated  -       Row Name 08/23/23 1048          Plan of Care Review    Plan of Care Reviewed With patient  -HP     Progress no change  -     Outcome Evaluation Pt amb in oliva with FWW and CGA. No knee buckling or LOB noted. Activity limited by fatigue especially in LLE. HEP and precautions reviewed with pt. Recommend d/c home with assist when medically appropriate. Pt cleared to d/c today from PT standpoint.  -       Row Name 08/23/23 1048          Therapy Assessment/Plan (PT)    Rehab Potential (PT) good, to achieve stated therapy goals  -     Criteria for Skilled Interventions Met (PT) yes;meets criteria;skilled treatment is necessary  -      Therapy Frequency (PT) daily  -HP       Row Name 08/23/23 1048          Vital Signs    Pre Systolic BP Rehab --  VSS  -HP     Pre Patient Position Sitting  -HP     Intra Patient Position Standing  -HP     Post Patient Position Sitting  -HP       Row Name 08/23/23 1048          Positioning and Restraints    Pre-Treatment Position sitting in chair/recliner  -HP     Post Treatment Position chair  -HP     In Chair notified nsg;reclined;sitting;call light within reach;encouraged to call for assist;exit alarm on;with family/caregiver;legs elevated;waffle cushion  -               User Key  (r) = Recorded By, (t) = Taken By, (c) = Cosigned By      Initials Name Provider Type    HP La Flores PT Physical Therapist                   Outcome Measures       Row Name 08/23/23 1050          How much help from another person do you currently need...    Turning from your back to your side while in flat bed without using bedrails? 3  -HP     Moving from lying on back to sitting on the side of a flat bed without bedrails? 3  -HP     Moving to and from a bed to a chair (including a wheelchair)? 3  -HP     Standing up from a chair using your arms (e.g., wheelchair, bedside chair)? 3  -HP     Climbing 3-5 steps with a railing? 3  -HP     To walk in hospital room? 3  -HP     AM-PAC 6 Clicks Score (PT) 18  -HP     Highest level of mobility 6 --> Walked 10 steps or more  -       Row Name 08/23/23 1050 08/23/23 1005       Functional Assessment    Outcome Measure Options AM-PAC 6 Clicks Basic Mobility (PT)  -HP AM-PAC 6 Clicks Daily Activity (OT)  -TB              User Key  (r) = Recorded By, (t) = Taken By, (c) = Cosigned By      Initials Name Provider Type    TB Estelle Dickey OT Occupational Therapist    La Rodrigues PT Physical Therapist                                 Physical Therapy Education       Title: PT OT SLP Therapies (In Progress)       Topic: Physical Therapy (Done)       Point: Mobility training  (Done)       Learning Progress Summary             Patient Acceptance, E,D, VU,DU by  at 8/23/2023 1050                         Point: Home exercise program (Done)       Learning Progress Summary             Patient Acceptance, E,D, VU,DU by  at 8/23/2023 1050                         Point: Body mechanics (Done)       Learning Progress Summary             Patient Acceptance, E,D, VU,DU by  at 8/23/2023 1050                         Point: Precautions (Done)       Learning Progress Summary             Patient Acceptance, E,D, VU,DU by  at 8/23/2023 1050                                         User Key       Initials Effective Dates Name Provider Type Discipline     06/01/21 -  La Flores, PT Physical Therapist PT                  PT Recommendation and Plan  Planned Therapy Interventions (PT): balance training, bed mobility training, gait training, home exercise program, transfer training, stretching, patient/family education, strengthening, stair training, ROM (range of motion)  Plan of Care Reviewed With: patient  Progress: no change  Outcome Evaluation: Pt amb in oliva with FWW and CGA. No knee buckling or LOB noted. Activity limited by fatigue especially in LLE. HEP and precautions reviewed with pt. Recommend d/c home with assist when medically appropriate. Pt cleared to d/c today from PT standpoint.     Time Calculation:   PT Evaluation Complexity  History, PT Evaluation Complexity: 1-2 personal factors and/or comorbidities  Examination of Body Systems (PT Eval Complexity): total of 3 or more elements  Clinical Presentation (PT Evaluation Complexity): stable  Clinical Decision Making (PT Evaluation Complexity): low complexity  Overall Complexity (PT Evaluation Complexity): low complexity     PT Charges       Row Name 08/23/23 1015             Time Calculation    Start Time 1015  -HP      PT Received On 08/23/23  -      PT Goal Re-Cert Due Date 09/02/23  -         Timed Charges    96171 - PT  Therapeutic Exercise Minutes 10  -HP         Untimed Charges    PT Eval/Re-eval Minutes 35  -HP         Total Minutes    Timed Charges Total Minutes 10  -HP      Untimed Charges Total Minutes 35  -HP       Total Minutes 45  -HP                User Key  (r) = Recorded By, (t) = Taken By, (c) = Cosigned By      Initials Name Provider Type    HP La Flores, PT Physical Therapist                  Therapy Charges for Today       Code Description Service Date Service Provider Modifiers Qty    01456110131 HC PT THER PROC EA 15 MIN 8/23/2023 La Flores, PT GP 1    58701652613 HC PT EVAL LOW COMPLEXITY 3 8/23/2023 La Flores, PT GP 1            PT G-Codes  Outcome Measure Options: AM-PAC 6 Clicks Basic Mobility (PT)  AM-PAC 6 Clicks Score (PT): 18  AM-PAC 6 Clicks Score (OT): 18  PT Discharge Summary  Anticipated Discharge Disposition (PT): home with assist    La Flores PT  8/23/2023

## 2023-08-24 VITALS
HEIGHT: 65 IN | TEMPERATURE: 98.3 F | WEIGHT: 147.05 LBS | BODY MASS INDEX: 24.5 KG/M2 | SYSTOLIC BLOOD PRESSURE: 119 MMHG | OXYGEN SATURATION: 92 % | HEART RATE: 64 BPM | DIASTOLIC BLOOD PRESSURE: 54 MMHG | RESPIRATION RATE: 18 BRPM

## 2023-08-24 PROBLEM — M48.061 STENOSIS OF LATERAL RECESS OF LUMBAR SPINE: Status: RESOLVED | Noted: 2023-08-17 | Resolved: 2023-08-24

## 2023-08-24 LAB
GLUCOSE BLDC GLUCOMTR-MCNC: 108 MG/DL (ref 70–130)
GLUCOSE BLDC GLUCOMTR-MCNC: 224 MG/DL (ref 70–130)

## 2023-08-24 PROCEDURE — 63710000001 OXYCODONE-ACETAMINOPHEN 5-325 MG TABLET: Performed by: NEUROLOGICAL SURGERY

## 2023-08-24 PROCEDURE — A9270 NON-COVERED ITEM OR SERVICE: HCPCS | Performed by: NEUROLOGICAL SURGERY

## 2023-08-24 PROCEDURE — 63710000001 EMPAGLIFLOZIN 10 MG TABLET: Performed by: NEUROLOGICAL SURGERY

## 2023-08-24 PROCEDURE — 63710000001 LAMOTRIGINE 100 MG TABLET: Performed by: NEUROLOGICAL SURGERY

## 2023-08-24 PROCEDURE — 63710000001 GABAPENTIN 400 MG CAPSULE: Performed by: NEUROLOGICAL SURGERY

## 2023-08-24 PROCEDURE — 97116 GAIT TRAINING THERAPY: CPT

## 2023-08-24 PROCEDURE — 63710000001 VALSARTAN 160 MG TABLET: Performed by: NEUROLOGICAL SURGERY

## 2023-08-24 PROCEDURE — 63710000001 HYDROCHLOROTHIAZIDE 25 MG TABLET: Performed by: NEUROLOGICAL SURGERY

## 2023-08-24 PROCEDURE — 63710000001 METHOCARBAMOL 500 MG TABLET: Performed by: NEUROLOGICAL SURGERY

## 2023-08-24 PROCEDURE — 63710000001 PANTOPRAZOLE 40 MG TABLET DELAYED-RELEASE: Performed by: NEUROLOGICAL SURGERY

## 2023-08-24 PROCEDURE — 63710000001 NIFEDIPINE XL 30 MG TABLET SUSTAINED-RELEASE 24 HOUR: Performed by: NEUROLOGICAL SURGERY

## 2023-08-24 PROCEDURE — 82948 REAGENT STRIP/BLOOD GLUCOSE: CPT

## 2023-08-24 PROCEDURE — 63710000001 ASPIRIN 81 MG TABLET DELAYED-RELEASE: Performed by: NEUROLOGICAL SURGERY

## 2023-08-24 PROCEDURE — 97535 SELF CARE MNGMENT TRAINING: CPT

## 2023-08-24 PROCEDURE — 63710000001 OXYCODONE-ACETAMINOPHEN 10-325 MG TABLET: Performed by: NEUROLOGICAL SURGERY

## 2023-08-24 PROCEDURE — 97110 THERAPEUTIC EXERCISES: CPT

## 2023-08-24 PROCEDURE — 63710000001 CITALOPRAM 20 MG TABLET: Performed by: NEUROLOGICAL SURGERY

## 2023-08-24 PROCEDURE — 63710000001 TIZANIDINE 4 MG TABLET: Performed by: NEUROLOGICAL SURGERY

## 2023-08-24 RX ORDER — HEPARIN SODIUM 5000 [USP'U]/ML
5000 INJECTION, SOLUTION INTRAVENOUS; SUBCUTANEOUS EVERY 8 HOURS SCHEDULED
Status: DISCONTINUED | OUTPATIENT
Start: 2023-08-24 | End: 2023-08-24 | Stop reason: HOSPADM

## 2023-08-24 RX ORDER — METHOCARBAMOL 500 MG/1
500 TABLET, FILM COATED ORAL 4 TIMES DAILY
Qty: 56 TABLET | Refills: 0 | Status: SHIPPED | OUTPATIENT
Start: 2023-08-24

## 2023-08-24 RX ADMIN — METHOCARBAMOL 1000 MG: 500 TABLET ORAL at 02:48

## 2023-08-24 RX ADMIN — NIFEDIPINE 30 MG: 30 TABLET, EXTENDED RELEASE ORAL at 08:33

## 2023-08-24 RX ADMIN — OXYCODONE HYDROCHLORIDE AND ACETAMINOPHEN 1 TABLET: 10; 325 TABLET ORAL at 10:02

## 2023-08-24 RX ADMIN — EMPAGLIFLOZIN 10 MG: 10 TABLET, FILM COATED ORAL at 08:33

## 2023-08-24 RX ADMIN — LAMOTRIGINE 100 MG: 100 TABLET ORAL at 08:33

## 2023-08-24 RX ADMIN — TIZANIDINE 6 MG: 4 TABLET ORAL at 08:33

## 2023-08-24 RX ADMIN — METHOCARBAMOL 1000 MG: 500 TABLET ORAL at 10:00

## 2023-08-24 RX ADMIN — HYDROCHLOROTHIAZIDE 25 MG: 25 TABLET ORAL at 08:33

## 2023-08-24 RX ADMIN — ASPIRIN 81 MG: 81 TABLET, COATED ORAL at 08:33

## 2023-08-24 RX ADMIN — GABAPENTIN 400 MG: 400 CAPSULE ORAL at 08:33

## 2023-08-24 RX ADMIN — OXYCODONE HYDROCHLORIDE AND ACETAMINOPHEN 1 TABLET: 10; 325 TABLET ORAL at 01:35

## 2023-08-24 RX ADMIN — OXYCODONE HYDROCHLORIDE AND ACETAMINOPHEN 1 TABLET: 10; 325 TABLET ORAL at 14:27

## 2023-08-24 RX ADMIN — VALSARTAN 320 MG: 160 TABLET, FILM COATED ORAL at 08:33

## 2023-08-24 RX ADMIN — OXYCODONE HYDROCHLORIDE AND ACETAMINOPHEN 1 TABLET: 10; 325 TABLET ORAL at 06:05

## 2023-08-24 RX ADMIN — CITALOPRAM HYDROBROMIDE 20 MG: 20 TABLET ORAL at 08:33

## 2023-08-24 RX ADMIN — PANTOPRAZOLE SODIUM 40 MG: 40 TABLET, DELAYED RELEASE ORAL at 06:05

## 2023-08-24 NOTE — DISCHARGE INSTR - ACTIVITY
Perform activities in a safe and controlled manner.      Attention to good mechanics, and avoid compromising positions such as twisting or turning even with small loads.  We encourage a good balance of rest and doing light walking and light activity around the house.  This should be pain limited.      You are allowed to take short showers as long as she protects the incision site.    - Change the bandage at least once per day    - If you notice any spiking fevers, purulent drainage from the incision site, swelling or erythema at the surgical site, severe headaches, or new or worsening neurological symptoms Call the office with any wound swelling, redness or discharge.     May use antibacterial soap and water to wash wound, pat dry. No neosporin to wound. Call for fever >101 that doesn't respond to tylenol and/or advil. ,contact the office or seek medical attention in an urgent manner.

## 2023-08-24 NOTE — PROGRESS NOTES
Chief complaint: Left leg pain, L5 radiculopathy    Admit Diagnosis:  Stenosis of lateral recess of lumbar spine [M48.061]  Lumbosacral radiculopathy at L5 [M54.17]  S/P lumbar fusion [Z98.1]  Lumbar spondylosis [M47.816]     Progress Summary:   Post-op day postop day 2 after an L4-5 posterior fusion with decompression and exploration of prior L5-S1 fusion, use of bone morphogenic protein    Previous 24 hr course: She continues to report that the neurogenic pain in her left leg has greatly reduced since immediately after surgery.  The pain pattern was the same on the lateral left buttock, hip, and down into the lateral half of the foot.  She reports terrible sleep for 2 nights in a row, she still pleasant but reports poor to moderate pain control.    Nutrition and diet impression.  The patient is eating well from what I can tell    She walked with PT and felt good about it yesterday.    History of present illness:  This is a 68-year-old female who has been seen in the office several times.  Over this course she has had several exacerbations of pain lower back and left leg, mostly on the outer portion and into the foot in an L5 distribution.  She was seen on 8/14/2023 to review a myelogram and other imaging studies.  She was scheduled for surgery which was performed on 8/22/2023      ROS:  A 12 point review of systems was performed.  All systems reviewed were negative with the exception of those mentioned in the history of present illness.    Past medical history:  Past Medical History:   Diagnosis Date    Anemia     Arthritis     Asthma     Bronchitis     Diabetes mellitus     Headache     Heart attack     Heart disease     History of transfusion     Hyperlipidemia     Hypertension     Low back pain     Lumbosacral disc disease     Pneumonia     Thyroid disease        Past surgical history:  Past Surgical History:   Procedure Laterality Date    BACK SURGERY      CORONARY ARTERY BYPASS GRAFT      HYSTERECTOMY  1985     INTERVENTIONAL RADIOLOGY PROCEDURE N/A 7/19/2023    Procedure: IR myelogram lumbar with fluoroscopy;  Surgeon: Aaron Cordoba MD;  Location: [facility] CATH INVASIVE LOCATION;  Service: Interventional Radiology;  Laterality: N/A;    LUMBAR DISCECTOMY FUSION INSTRUMENTATION N/A 8/22/2023    Procedure: posterior fusion with decompression L4-5 and exploration of prior L5-S1 fusion, use of bone morphogenic protein;  Surgeon: Honorio Jacques MD;  Location: [facility] OR;  Service: Neurosurgery;  Laterality: N/A;    THYROIDECTOMY         Social history:  Social History     Socioeconomic History    Marital status:    Tobacco Use    Smoking status: Former     Packs/day: 1.00     Types: Cigarettes    Smokeless tobacco: Never   Substance and Sexual Activity    Alcohol use: Never    Drug use: Never    Sexual activity: Defer       Family history:  Family History   Problem Relation Age of Onset    Arthritis Mother     Cancer Mother     Heart disease Mother     Hypertension Mother     Arthritis Father     Diabetes Sister     Hypertension Sister        Allergies:  Allergies   Allergen Reactions    Carvedilol Shortness Of Breath     cough    Augmentin [Amoxicillin-Pot Clavulanate] Diarrhea    Gatifloxacin Delirium and Nausea And Vomiting    Morphine Nausea And Vomiting       Outpatient medications:  Scheduled Meds:aspirin, 81 mg, Oral, Daily  atorvastatin, 80 mg, Oral, Nightly  citalopram, 20 mg, Oral, Daily  empagliflozin, 10 mg, Oral, Daily  gabapentin, 400 mg, Oral, TID  valsartan, 320 mg, Oral, Q24H   And  hydroCHLOROthiazide, 25 mg, Oral, Q24H  lamoTRIgine, 100 mg, Oral, BID  NIFEdipine XL, 30 mg, Oral, Daily  pantoprazole, 40 mg, Oral, Q AM  tiZANidine, 6 mg, Oral, TID      Continuous Infusions:sodium chloride 0.45 % with KCl 20 mEq, 100 mL/hr, Last Rate: 100 mL/hr (08/22/23 1818)      PRN Meds:.  acetaminophen    albuterol sulfate HFA    ALPRAZolam    bisacodyl    ibuprofen    methocarbamol    ondansetron  **OR** ondansetron    oxyCODONE-acetaminophen    oxyCODONE-acetaminophen    polyethylene glycol    senna-docusate sodium    Physical Examination:    Vitals:    23 0833   BP: 136/60   Pulse: 80   Resp:    Temp:    SpO2:      Systolic (24hrs), Av , Min:118 , Max:156     Diastolic (24hrs), Av, Min:44, Max:83    Pulse  Av.2  Min: 59  Max: 80  Temp (24hrs), Av.2 øF (36.8 øC), Min:97.9 øF (36.6 øC), Max:98.4 øF (36.9 øC)          The Surgical site is clean with with moist skin edges, there are no obvious signs of erythema or infection.   A small amount of nonfluctuant swelling was noted on the upper right side of the incision.  This is approximately half inch tall 3/4 inch wide and about 3 inches long.  This area of swelling was nontender to palpation.  There is no purulent drainage noted anywhere along the incision site.  Drain output was 50 mL overnight.     General: The patient is pleasant and conversational, and is not in acute distress.   Neurological: Cranial nerves II through XII are grossly intact. Facial expressions are equal bilaterally.     Eyes: Extraocular eye movements are intact, and pupils are equal, round, and reactive to light.  Musculoskeletal:   Upper extremity strength is 5/5  Lower extremity strength is 5/5.  On exam her left leg is weaker compared to her right, however she is able to perform hip flexion/extension knee flexion/extension dorsiflexion and plantarflexion all with satisfactory strength and coordination  Sensation: Sensation is altered with continued numbness along the left side of the leg with some pain but this is very manageable compared to presurgery    HEENT: Normocephalic, atraumatic.   Cardiovascular: Regular rate and rhythm.  No edema noted  Pulmonary: Normal respiratory effort  Abdomen: Nontender  Psychiatric: Normal mood and affect      Imaging:  There is no new imaging to review      Assessment and Plan:    Neurosurgery Focused Problems:    Postop L4-5  PLIF extension from previous L5-S1 fusion    -The incision site is healing well.  Change bandage once a day or as needed.  Report drainage that is soaking bandages within 2 hours or less.  Report purulent drainage or changes in the site becoming very tender or erythematous  - GHADA drain has minimal drainage, this can be removed today.  - She is still hesitant to discharge because she feels her pain control is not yet adequate.  We discussed the balance of reaching a point that she would be able to recover just as well at home as she could resting in the hospital.  I feel this is close and she also would like to go home despite a little anxiety about the recovery process, which is understandable.  Overall we came to the conclusion to revisit the possibility of discharge in several hours.  We agreed upon a goal that if she has adequate pain control by late morning that we would proceed with planning for discharge home with the goal of this afternoon.  We will discharge her with access to similar medications which she has been receiving in the hospital in hopes of maintaining adequate pain control at home.  She understands the side effects such as constipation and nausea.  She is not opioid na‹ve and has tolerated this dose in the hospital well thus far.      Shakeel Aguirre PA-C

## 2023-08-24 NOTE — DISCHARGE SUMMARY
[facility]    Date of Admission: 8/22/2023  Date of Discharge:  8/24/2023    Discharge Diagnosis:   Lumbar spondylosis, S/P lumbar fusion    Procedures Performed  Procedure(s):  posterior fusion with decompression L4-5 and exploration of prior L5-S1 fusion, use of bone morphogenic protein       Presenting Problem/History of Present Illness  Stenosis of lateral recess of lumbar spine [M48.061]  Lumbosacral radiculopathy at L5 [M54.17]  S/P lumbar fusion [Z98.1]  Lumbar spondylosis [M47.816]     Hospital Course  Patient is a 68 y.o. female presented with increased pain in her left leg.  She had previously had an L5-S1 lumbar fusion performed by Dr. Liriano about 10 years ago.  She was seen in office within the past month, and scheduled for surgery after assessment and reviewing images.  An L4-L5 PLIF was performed.  Hardware from a previous fusion was removed at levels L5 and S1.  The surgery progressed well, and she was released to the recovery room in satisfactory condition.  She had 1 hospital night stay which was mostly due to her fear of poor pain control.  She takes Percocet 10 at baseline which she receives outpatient from pain management.  We continued this during the hospital course along with appropriate muscle relaxers and other treatments.  She is able to ambulate well and was suggested to be discharged home by PT.  From a neurosurgical standpoint we are pleased that the pain in her left leg along the L5 distribution was almost completely eradicated immediately after surgery.  Strength is better than before surgery and rated 5/5 in both lower extremities.  She does have some noticeable weakness on the left side compared to the right, this is most noticeable in hip flexion.  However this is not greatly impact her ability to be independent.  We hope that her recovery and strength and coordination will continue to improve in the coming weeks and months.  -  We will plan to see her for an outpatient follow-up visit in 2 weeks  -She has been sent home continuing the medications that she had before admittance, and has received appropriate medications for pain management in this postop period.    Condition on Discharge:  Satisfactory    Discharge Disposition  Home or Self Care    Discharge Instructions  Perform activities in a safe and controlled manner.  Attention to good mechanics, and avoid compromising positions such as twisting or turning even with small loads.  We encourage a good balance of rest and doing light walking and light activity around the house.  This should be pain limited.  She is allowed to take short showers as long as she protects the incision site.  - Change the bandage at least once per day  - If she notices any spiking fevers, purulent drainage from the incision site, swelling or erythema at the surgical site, severe headaches, or new or worsening neurological symptoms, that she should contact the office or seek medical attention in an urgent manner.  -Follow-up in 2 weeks    Discharge Medications     Discharge Medications        New Medications        Instructions Start Date   methocarbamol 500 MG tablet  Commonly known as: ROBAXIN   500 mg, Oral, 4 Times Daily             Changes to Medications        Instructions Start Date   oxyCODONE-acetaminophen  MG per tablet  Commonly known as: PERCOCET  What changed: Another medication with the same name was changed. Make sure you understand how and when to take each.   1 tablet, Oral, Every 8 Hours PRN      oxyCODONE-acetaminophen  MG per tablet  Commonly known as: Percocet  What changed:   when to take this  reasons to take this   1 tablet, Oral, Every 6 Hours PRN             Continue These Medications        Instructions Start Date   albuterol sulfate  (90 Base) MCG/ACT inhaler  Commonly known as: PROVENTIL HFA;VENTOLIN HFA;PROAIR HFA   No dose, route, or frequency recorded.       ALPRAZolam 0.5 MG tablet  Commonly known as: XANAX   Take 1 tablet by mouth At Night As Needed.      amLODIPine 10 MG tablet  Commonly known as: NORVASC   Take 1 tablet by mouth Daily.      aspirin 81 MG EC tablet   81 mg, Oral, Daily      atorvastatin 80 MG tablet  Commonly known as: LIPITOR   80 mg, Oral, Nightly      bisoprolol 10 MG tablet  Commonly known as: ZEBeta   10 mg, Oral, Daily      citalopram 20 MG tablet  Commonly known as: CeleXA   Take 1 tablet by mouth Daily.      gabapentin 400 MG capsule  Commonly known as: NEURONTIN   400 mg, Oral, 3 Times Daily      Jardiance 10 MG tablet tablet  Generic drug: empagliflozin   10 mg, Oral, Daily      lamoTRIgine 100 MG tablet  Commonly known as: LaMICtal   100 mg, Oral, 2 Times Daily      NIFEdipine XL 30 MG 24 hr tablet  Commonly known as: PROCARDIA XL   30 mg, Oral, Daily      omeprazole 20 MG capsule  Commonly known as: priLOSEC   20 mg, Oral, Daily      tiZANidine 4 MG tablet  Commonly known as: ZANAFLEX   6 mg, Oral, 3 Times Daily      valsartan-hydrochlorothiazide 320-25 MG per tablet  Commonly known as: DIOVAN-HCT   1 tablet, Oral, Daily      Vitamin D3 50 MCG (2000 UT) tablet   1 tablet, Oral, Nightly               Follow-up Appointments  No future appointments.  Additional Instructions for the Follow-ups that You Need to Schedule       Discharge Follow-up with Specified Provider: Shakeel Aguirre PA-C; 2 Weeks   As directed      To: Shakeel Aguirre PA-C   Follow Up: 2 Weeks                Referring Provider  Honorio Jacques*    PCP   James Brooks MD Jonathan Caleb Prior, PA-C  08/24/23  13:10 EDT

## 2023-08-24 NOTE — THERAPY TREATMENT NOTE
Patient Name: Vibha Mcdowell  : 1955    MRN: [id number]                              Today's Date: 2023       Admit Date: 2023    Visit Dx:     ICD-10-CM ICD-9-CM   1. S/P lumbar fusion  Z98.1 V45.4   2. Stenosis of lateral recess of lumbar spine  M48.061 724.02   3. Lumbosacral radiculopathy at L5  M54.17 724.4     Patient Active Problem List   Diagnosis    Arthritis    Low back pain    DDD (degenerative disc disease), lumbar    Spondylosis of lumbar spine    Lumbosacral radiculopathy at L5    History of back surgery    Pain of left hip    Lumbar spondylosis    S/P lumbar fusion    Type 2 diabetes mellitus, without long-term current use of insulin    Coronary artery disease involving coronary bypass graft of native heart without angina pectoris    Asthma     Past Medical History:   Diagnosis Date    Anemia     Arthritis     Asthma     Bronchitis     Diabetes mellitus     Headache     Heart attack     Heart disease     History of transfusion     Hyperlipidemia     Hypertension     Low back pain     Lumbosacral disc disease     Pneumonia     Thyroid disease      Past Surgical History:   Procedure Laterality Date    BACK SURGERY      CORONARY ARTERY BYPASS GRAFT      HYSTERECTOMY      INTERVENTIONAL RADIOLOGY PROCEDURE N/A 2023    Procedure: IR myelogram lumbar with fluoroscopy;  Surgeon: Aaron Cordoba MD;  Location: [facility] CATH INVASIVE LOCATION;  Service: Interventional Radiology;  Laterality: N/A;    LUMBAR DISCECTOMY FUSION INSTRUMENTATION N/A 2023    Procedure: posterior fusion with decompression L4-5 and exploration of prior L5-S1 fusion, use of bone morphogenic protein;  Surgeon: Honorio Jacques MD;  Location: [facility] OR;  Service: Neurosurgery;  Laterality: N/A;    THYROIDECTOMY        General Information       Row Name 23 1412          OT Time and Intention    Document Type therapy note (daily note)  -KF     Mode of Treatment occupational therapy  -KF        Row Name 08/24/23 1412          General Information    Patient Profile Reviewed yes  -     Existing Precautions/Restrictions fall  -       Row Name 08/24/23 1412          Cognition    Orientation Status (Cognition) oriented x 4  -       Row Name 08/24/23 1412          Safety Issues, Functional Mobility    Impairments Affecting Function (Mobility) endurance/activity tolerance;pain;strength  -               User Key  (r) = Recorded By, (t) = Taken By, (c) = Cosigned By      Initials Name Provider Type    KF Francia Reece OT Occupational Therapist                     Mobility/ADL's       Row Name 08/24/23 1413          Bed Mobility    Comment, (Bed Mobility) Pt found up in bathroom, left sitting EOB with PT.  -       Row Name 08/24/23 1413          Transfers    Transfers sit-stand transfer;stand-sit transfer;toilet transfer  -       Row Name 08/24/23 1413          Sit-Stand Transfer    Sit-Stand [place] (Transfers) contact guard;1 person assist  -     Assistive Device (Sit-Stand Transfers) walker, front-wheeled  -       Row Name 08/24/23 1413          Stand-Sit Transfer    Stand-Sit [place] (Transfers) contact guard;1 person assist  -     Assistive Device (Stand-Sit Transfers) walker, front-wheeled  -       Row Name 08/24/23 1413          Toilet Transfer    Type (Toilet Transfer) sit-stand  -     [place] Level (Toilet Transfer) contact guard;1 person assist  -     Assistive Device (Toilet Transfer) commode;walker, front-wheeled;grab bars/safety frame  -       Row Name 08/24/23 1413          Functional Mobility    Functional Mobility- Ind. Level contact guard assist;1 person  -     Functional Mobility- Device walker, front-wheeled  -     Functional Mobility-Distance (Feet) --  In rooom ambulation for ADLs  -       Row Name 08/24/23 1413          Activities of Daily Living    BADL Assessment/Intervention upper body dressing;lower body dressing;grooming;toileting  -        Row Name 08/24/23 1413          Bathing Assessment/Intervention    Comment, (Bathing) Review of spinal precautions and ADL retraining.  -[facility] Name 08/24/23 1413          Upper Body Dressing Assessment/Training    [place] Level (Upper Body Dressing) doff;don;bra/undergarment;pull-over garment;set up  -KF     Position (Upper Body Dressing) edge of bed sitting;unsupported sitting  -[facility] Name 08/24/23 1413          Lower Body Dressing Assessment/Training    [place] Level (Lower Body Dressing) doff;don;pants/bottoms;socks;set up  -KF     Assistive Devices (Lower Body Dressing) long-handled shoe horn;reacher;sock-aid  -KF     Position (Lower Body Dressing) edge of bed sitting;unsupported sitting  -KF     Comment, (Lower Body Dressing) Reviewed ADL AE. TBD completed with set up and cueing for proper ADL AE use.  -[facility] Name 08/24/23 1413          Grooming Assessment/Training    [place] Level (Grooming) wash face, hands;standby assist  -KF     Position (Grooming) supported standing  -KF       Row Name 08/24/23 1413          Toileting Assessment/Training    [place] Level (Toileting) toileting skills;adjust/manage clothing;perform perineal hygiene;supervision  -KF     Assistive Devices (Toileting) commode;grab bar/safety frame  -KF     Position (Toileting) unsupported sitting;supported standing  -KF               User Key  (r) = Recorded By, (t) = Taken By, (c) = Cosigned By      Initials Name Provider Type    KF Francia Reece OT Occupational Therapist                   Obj/Interventions       [facility] Name 08/24/23 1418          Balance    Balance Assessment sitting static balance;sitting dynamic balance;standing static balance;standing dynamic balance  -KF     Static Sitting Balance supervision  -     Dynamic Sitting Balance standby assist  -KF     Position, Sitting Balance unsupported;sitting edge of bed  -KF     Sit to Stand Dynamic Balance contact guard;verbal cues  -KF      Static Standing Balance contact guard  -KF     Dynamic Standing Balance contact guard  -KF     Position/Device Used, Standing Balance supported;walker, front-wheeled  -KF     Balance Interventions sitting;standing;sit to stand;static;dynamic;occupation based/functional task  -KF     Comment, Balance No overt LOB this date, fatigues quickly.  -KF               User Key  (r) = Recorded By, (t) = Taken By, (c) = Cosigned By      Initials Name Provider Type    KF Francia Reece, ROE Occupational Therapist                   Goals/Plan    No documentation.                  Clinical Impression       Row Name 08/24/23 1419          Pain Assessment    Pretreatment Pain Rating 5/10  -KF     Posttreatment Pain Rating 5/10  -KF     Pain Location generalized  -KF     Pain Location - back  -KF     Pain Intervention(s) Repositioned;Ambulation/increased activity;Rest  -       Row Name 08/24/23 1419          Plan of Care Review    Plan of Care Reviewed With patient  -KF     Progress improving  -KF     Outcome Evaluation Pt tolerated OT intervention well. Pt completed ADLs with set up and CGA. Pt is nearing her functional baseline, however remains limited by activity tolerance, mild balance deficits, and generalized weakness. Pt will benefit from continued IP OT services to address deficits mentioned. Recommend home with assist at d/c.  -       Row Name 08/24/23 1419          Therapy Assessment/Plan (OT)    Rehab Potential (OT) good, to achieve stated therapy goals  -     Criteria for Skilled Therapeutic Interventions Met (OT) yes;skilled treatment is necessary  -     Therapy Frequency (OT) daily  -       Row Name 08/24/23 1419          Therapy Plan Review/Discharge Plan (OT)    Anticipated Discharge Disposition (OT) home with assist  -       Row Name 08/24/23 1419          Vital Signs    Pre Patient Position Sitting  At commode in bathroom  -KF     Intra Patient Position Standing  -KF     Post Patient Position Sitting   -KF       Row Name 08/24/23 1419          Positioning and Restraints    Pre-Treatment Position bathroom  -KF     Post Treatment Position other  -KF     Other Position --  Sitting EOB, left with PT  -KF               User Key  (r) = Recorded By, (t) = Taken By, (c) = Cosigned By      Initials Name Provider Type    Francia Brewer OT Occupational Therapist                   Outcome Measures       Row Name 08/24/23 1422          How much help from another is currently needed...    Putting on and taking off regular lower body clothing? 3  -KF     Bathing (including washing, rinsing, and drying) 3  -KF     Toileting (which includes using toilet bed pan or urinal) 3  -KF     Putting on and taking off regular upper body clothing 4  -KF     Taking care of personal grooming (such as brushing teeth) 4  -KF     Eating meals 4  -KF     AM-PAC 6 Clicks Score (OT) 21  -KF       Row Name 08/24/23 1422          Functional Assessment    Outcome Measure Options AM-PAC 6 Clicks Daily Activity (OT)  -KF               User Key  (r) = Recorded By, (t) = Taken By, (c) = Cosigned By      Initials Name Provider Type    Francia Brewer OT Occupational Therapist                    Occupational Therapy Education       Title: PT OT SLP Therapies (In Progress)       Topic: Occupational Therapy (In Progress)       Point: ADL training (Done)       Description:   Instruct learner(s) on proper safety adaptation and remediation techniques during self care or transfers.   Instruct in proper use of assistive devices.                  Learning Progress Summary             Patient Eager, E,D, VU by KF at 8/24/2023 1354    Acceptance, E,D, VU,DU,NR by TB at 8/23/2023 1006                         Point: Home exercise program (Not Started)       Description:   Instruct learner(s) on appropriate technique for monitoring, assisting and/or progressing therapeutic exercises/activities.                  Learner Progress:  Not documented in this visit.               Point: Precautions (Done)       Description:   Instruct learner(s) on prescribed precautions during self-care and functional transfers.                  Learning Progress Summary             Patient Eager, E,D, VU by  at 8/24/2023 1354    Acceptance, E,D, VU,DU,NR by  at 8/23/2023 1006                         Point: Body mechanics (Done)       Description:   Instruct learner(s) on proper positioning and spine alignment during self-care, functional mobility activities and/or exercises.                  Learning Progress Summary             Patient Eager, E,D, VU by  at 8/24/2023 1354                                         User Key       Initials Effective Dates Name Provider Type Discipline     07/11/23 -  Estelle Dickey OT Occupational Therapist OT     08/09/23 -  Francia Reece OT Occupational Therapist OT                  OT Recommendation and Plan  Therapy Frequency (OT): daily  Plan of Care Review  Plan of Care Reviewed With: patient  Progress: improving  Outcome Evaluation: Pt tolerated OT intervention well. Pt completed ADLs with set up and CGA. Pt is nearing her functional baseline, however remains limited by activity tolerance, mild balance deficits, and generalized weakness. Pt will benefit from continued IP OT services to address deficits mentioned. Recommend home with assist at d/c.     Time Calculation:         Time Calculation- OT       Row Name 08/24/23 1424             Time Calculation- OT    OT Start Time 1354  -KF      OT Received On 08/24/23  -KF      OT Goal Re-Cert Due Date 09/02/23  -KF         Timed Charges    03635 - OT Self Care/Mgmt Minutes 16  -KF         Total Minutes    Timed Charges Total Minutes 16  -KF       Total Minutes 16  -KF                User Key  (r) = Recorded By, (t) = Taken By, (c) = Cosigned By      Initials Name Provider Type    KF Francia Reece OT Occupational Therapist                  Therapy Charges for Today       Code Description  Service Date Service Provider Modifiers Qty    [id number] HC OT SELF CARE/MGMT/TRAIN EA 15 MIN 8/24/2023 Francia Reece, OT GO 1                 Francia Reece OT  8/24/2023

## 2023-08-24 NOTE — THERAPY TREATMENT NOTE
Patient Name: Vibha Mcdowell  : 1955    MRN: [id number]                              Today's Date: 2023       Admit Date: 2023    Visit Dx:     ICD-10-CM ICD-9-CM   1. S/P lumbar fusion  Z98.1 V45.4   2. Stenosis of lateral recess of lumbar spine  M48.061 724.02   3. Lumbosacral radiculopathy at L5  M54.17 724.4     Patient Active Problem List   Diagnosis    Arthritis    Low back pain    DDD (degenerative disc disease), lumbar    Spondylosis of lumbar spine    Lumbosacral radiculopathy at L5    History of back surgery    Pain of left hip    Lumbar spondylosis    S/P lumbar fusion    Type 2 diabetes mellitus, without long-term current use of insulin    Coronary artery disease involving coronary bypass graft of native heart without angina pectoris    Asthma     Past Medical History:   Diagnosis Date    Anemia     Arthritis     Asthma     Bronchitis     Diabetes mellitus     Headache     Heart attack     Heart disease     History of transfusion     Hyperlipidemia     Hypertension     Low back pain     Lumbosacral disc disease     Pneumonia     Thyroid disease      Past Surgical History:   Procedure Laterality Date    BACK SURGERY      CORONARY ARTERY BYPASS GRAFT      HYSTERECTOMY      INTERVENTIONAL RADIOLOGY PROCEDURE N/A 2023    Procedure: IR myelogram lumbar with fluoroscopy;  Surgeon: Aaron Cordoba MD;  Location: [facility] CATH INVASIVE LOCATION;  Service: Interventional Radiology;  Laterality: N/A;    LUMBAR DISCECTOMY FUSION INSTRUMENTATION N/A 2023    Procedure: posterior fusion with decompression L4-5 and exploration of prior L5-S1 fusion, use of bone morphogenic protein;  Surgeon: Honorio Jacques MD;  Location: [facility] OR;  Service: Neurosurgery;  Laterality: N/A;    THYROIDECTOMY        General Information       Row Name 23 1509          Physical Therapy Time and Intention    Document Type therapy note (daily note)  -CM     Mode of Treatment physical therapy   -CM       Row Name 08/24/23 1509          General Information    Patient Profile Reviewed yes  -CM     Existing Precautions/Restrictions fall;spinal  -CM     Barriers to Rehab none identified  -CM       Row Name 08/24/23 1509          Cognition    Orientation Status (Cognition) oriented x 4  -CM       Row Name 08/24/23 1509          Safety Issues, Functional Mobility    Safety Issues Affecting Function (Mobility) insight into deficits/self-awareness;safety precaution awareness  -CM     Impairments Affecting Function (Mobility) endurance/activity tolerance;pain;strength  -CM               User Key  (r) = Recorded By, (t) = Taken By, (c) = Cosigned By      Initials Name Provider Type    CM Marcy Villela PT Physical Therapist                   Mobility       Row Name 08/24/23 1509          Bed Mobility    Bed Mobility supine-sit;sit-supine  -CM     Supine-Sit Kouts (Bed Mobility) contact guard;1 person assist;verbal cues  -CM     Sit-Supine Kouts (Bed Mobility) supervision;1 person assist;verbal cues  -CM     Assistive Device (Bed Mobility) bed rails  -CM     Comment, (Bed Mobility) patient able to verbalize 2/3 spinal precautions, recalled logroll technique well and performed well with cues  -CM       Row Name 08/24/23 1509          Sit-Stand Transfer    Sit-Stand Kouts (Transfers) contact guard;1 person assist  -CM     Assistive Device (Sit-Stand Transfers) walker, front-wheeled  -CM     Comment, (Sit-Stand Transfer) patient's walker fitted and adjusted appropriately  -CM       Row Name 08/24/23 1509          Gait/Stairs (Locomotion)    Kouts Level (Gait) contact guard;verbal cues  -CM     Assistive Device (Gait) walker, front-wheeled  -CM     Distance in Feet (Gait) 300  -CM     Deviations/Abnormal Patterns (Gait) bilateral deviations;stride length decreased;weight shifting decreased;gait speed decreased;base of support, narrow  -CM     Bilateral Gait Deviations forward flexed  posture;heel strike decreased  -CM     Adams Level (Stairs) contact guard;1 person assist;verbal cues  -CM     Assistive Device (Stairs) walker, front-wheeled  -CM     Number of Steps (Stairs) 1  -CM     Ascending Technique (Stairs) step-to-step  -CM     Descending Technique (Stairs) step-to-step  -CM     Comment, (Gait/Stairs) Patient ambulated in oliva with a step through gait pattern, cues provided for increased ANGELITA and upright posture, patient with good effort to correct. No LOB or buckling noted with ambulation. Patient ascended and descended one platform step using posterior approach with walker with cues for sequencing and demonstrating good coordination and balance with this.  -CM               User Key  (r) = Recorded By, (t) = Taken By, (c) = Cosigned By      Initials Name Provider Type    Marcy Burgos, PT Physical Therapist                   Obj/Interventions       Row Name 08/24/23 1512          Motor Skills    Therapeutic Exercise hip;knee;ankle;other (see comments)  bent knee fallout and abdominal set 1x10  -CM       Row Name 08/24/23 1512          Hip (Therapeutic Exercise)    Hip (Therapeutic Exercise) isometric exercises;strengthening exercise  -CM     Hip Isometrics (Therapeutic Exercise) bilateral;gluteal sets;10 repetitions;3 second hold  -CM     Hip Strengthening (Therapeutic Exercise) bilateral;external rotation;internal rotation;supine;heel slides;10 repetitions  -CM       Row Name 08/24/23 1512          Knee (Therapeutic Exercise)    Knee (Therapeutic Exercise) isometric exercises  -CM     Knee Isometrics (Therapeutic Exercise) bilateral;quad sets;10 repetitions;3 second hold  -CM       Row Name 08/24/23 1512          Ankle (Therapeutic Exercise)    Ankle (Therapeutic Exercise) AROM (active range of motion)  -CM     Ankle AROM (Therapeutic Exercise) bilateral;dorsiflexion;plantarflexion;10 repetitions  -CM       Row Name 08/24/23 1512          Balance    Balance Assessment  sitting static balance;standing static balance;standing dynamic balance  -CM     Static Sitting Balance independent  -CM     Position, Sitting Balance unsupported;sitting edge of bed  -CM     Static Standing Balance standby assist  -CM     Dynamic Standing Balance contact guard  -CM     Position/Device Used, Standing Balance supported;walker, front-wheeled  -CM     Comment, Balance no LOB  -CM               User Key  (r) = Recorded By, (t) = Taken By, (c) = Cosigned By      Initials Name Provider Type    Marcy Burgos PT Physical Therapist                   Goals/Plan    No documentation.                  Clinical Impression       Row Name 08/24/23 1517          Pain    Pretreatment Pain Rating 5/10  -CM     Posttreatment Pain Rating 5/10  -CM     Pain Location - Side/Orientation Bilateral  -CM     Pain Location generalized  -CM     Pain Location - back  -CM     Pain Intervention(s) Ambulation/increased activity;Repositioned  -CM       Row Name 08/24/23 1517          Plan of Care Review    Plan of Care Reviewed With patient;family  -CM     Progress improving  -CM     Outcome Evaluation Patient showing improvement with ability to complete ambulation and stair training with CGA and FWW. Precautions and HEP reviewed with good understanding from patient. She continues to demonstrate deficits in endurance, strength, and balance indicating IPPT intervention. Continue to recommend D/C home with assist.  -CM       Row Name 08/24/23 1515          Vital Signs    Pre Systolic BP Rehab 145  -CM     Pre Treatment Diastolic BP 53  -CM     Pretreatment Heart Rate (beats/min) 67  -CM     Posttreatment Heart Rate (beats/min) 64  -CM     Pre SpO2 (%) 90  -CM     O2 Delivery Pre Treatment room air  -CM     O2 Delivery Intra Treatment room air  -CM     Post SpO2 (%) 93  -CM     O2 Delivery Post Treatment room air  -CM     Pre Patient Position Supine  -CM     Intra Patient Position Standing  -CM     Post Patient Position  Supine  -CM       Row Name 08/24/23 1517          Positioning and Restraints    Pre-Treatment Position in bed  -CM     Post Treatment Position bed  -CM     In Bed fowlers;call light within reach;encouraged to call for assist;notified nsg;with family/caregiver  RN deferred alarm  -CM               User Key  (r) = Recorded By, (t) = Taken By, (c) = Cosigned By      Initials Name Provider Type    Marcy Burgos, DESIREE Physical Therapist                   Outcome Measures       Row Name 08/24/23 1520          How much help from another person do you currently need...    Turning from your back to your side while in flat bed without using bedrails? 3  -CM     Moving from lying on back to sitting on the side of a flat bed without bedrails? 3  -CM     Moving to and from a bed to a chair (including a wheelchair)? 3  -CM     Standing up from a chair using your arms (e.g., wheelchair, bedside chair)? 3  -CM     Climbing 3-5 steps with a railing? 3  -CM     To walk in hospital room? 3  -CM     AM-PAC 6 Clicks Score (PT) 18  -CM     Highest level of mobility 6 --> Walked 10 steps or more  -CM       Row Name 08/24/23 1520 08/24/23 1422       Functional Assessment    Outcome Measure Options AM-PAC 6 Clicks Basic Mobility (PT)  -CM AM-PAC 6 Clicks Daily Activity (OT)  -KF              User Key  (r) = Recorded By, (t) = Taken By, (c) = Cosigned By      Initials Name Provider Type    Marcy Burgos, DESIREE Physical Therapist    Francia Brewer OT Occupational Therapist                                 Physical Therapy Education       Title: PT OT SLP Therapies (In Progress)       Topic: Physical Therapy (Done)       Point: Mobility training (Done)       Learning Progress Summary             Patient Acceptance, E, VU by CM at 8/24/2023 1520    Acceptance, E,D, VU,DU by HP at 8/23/2023 1050   Family Acceptance, E, VU by CM at 8/24/2023 1520                         Point: Home exercise program (Done)       Learning  Progress Summary             Patient Acceptance, E, VU by CM at 8/24/2023 1520    Acceptance, E,D, VU,DU by HP at 8/23/2023 1050   Family Acceptance, E, VU by CM at 8/24/2023 1520                         Point: Body mechanics (Done)       Learning Progress Summary             Patient Acceptance, E, VU by CM at 8/24/2023 1520    Acceptance, E,D, VU,DU by HP at 8/23/2023 1050   Family Acceptance, E, VU by CM at 8/24/2023 1520                         Point: Precautions (Done)       Learning Progress Summary             Patient Acceptance, E, VU by CM at 8/24/2023 1520    Acceptance, E,D, VU,DU by HP at 8/23/2023 1050   Family Acceptance, E, VU by CM at 8/24/2023 1520                                         User Key       Initials Effective Dates Name Provider Type Discipline     06/01/21 -  La Flores, DESIREE Physical Therapist PT     09/22/22 -  Marcy Villela PT Physical Therapist PT                  PT Recommendation and Plan     Plan of Care Reviewed With: patient, family  Progress: improving  Outcome Evaluation: Patient showing improvement with ability to complete ambulation and stair training with CGA and FWW. Precautions and HEP reviewed with good understanding from patient. She continues to demonstrate deficits in endurance, strength, and balance indicating IPPT intervention. Continue to recommend D/C home with assist.     Time Calculation:         PT Charges       Row Name 08/24/23 1520             Time Calculation    Start Time 1331  -CM      PT Received On 08/24/23  -CM      PT Goal Re-Cert Due Date 09/02/23  -CM         Timed Charges    57194 - PT Therapeutic Exercise Minutes 15  -CM      36632 - Gait Training Minutes  25  -CM         Total Minutes    Timed Charges Total Minutes 40  -CM       Total Minutes 40  -CM                User Key  (r) = Recorded By, (t) = Taken By, (c) = Cosigned By      Initials Name Provider Type    Marcy Burgos, DESIREE Physical Therapist                  Therapy  Charges for Today       Code Description Service Date Service Provider Modifiers Qty    12610345544 HC PT THER PROC EA 15 MIN 8/24/2023 Marcy Villela, PT GP 1    [id number] HC GAIT TRAINING EA 15 MIN 8/24/2023 Marcy Villela, PT GP 2            PT G-Codes  Outcome Measure Options: AM-PAC 6 Clicks Basic Mobility (PT)  AM-PAC 6 Clicks Score (PT): 18  AM-PAC 6 Clicks Score (OT): 21  PT Discharge Summary  Anticipated Discharge Disposition (PT): home with assist    Marcy Villela, PT  8/24/2023

## 2023-08-24 NOTE — PLAN OF CARE
Goal Outcome Evaluation:  Plan of Care Reviewed With: patient, family        Progress: improving  Outcome Evaluation: Patient showing improvement with ability to complete ambulation and stair training with CGA and FWW. Precautions and HEP reviewed with good understanding from patient. She continues to demonstrate deficits in endurance, strength, and balance indicating IPPT intervention. Continue to recommend D/C home with assist.      Anticipated Discharge Disposition (PT): home with assist

## 2023-08-24 NOTE — PLAN OF CARE
Goal Outcome Evaluation:  Plan of Care Reviewed With: patient        Progress: improving  Outcome Evaluation: Pt tolerated OT intervention well. Pt completed ADLs with set up and CGA. Pt is nearing her functional baseline, however remains limited by activity tolerance, mild balance deficits, and generalized weakness. Pt will benefit from continued IP OT services to address deficits mentioned. Recommend home with assist at d/c.      Anticipated Discharge Disposition (OT): home with assist

## 2023-09-07 ENCOUNTER — OFFICE VISIT (OUTPATIENT)
Dept: NEUROSURGERY | Facility: CLINIC | Age: 68
End: 2023-09-07
Payer: MEDICARE

## 2023-09-07 VITALS
TEMPERATURE: 97.7 F | SYSTOLIC BLOOD PRESSURE: 120 MMHG | HEIGHT: 65 IN | WEIGHT: 144 LBS | BODY MASS INDEX: 23.99 KG/M2 | DIASTOLIC BLOOD PRESSURE: 52 MMHG

## 2023-09-07 DIAGNOSIS — Z98.890 HISTORY OF BACK SURGERY: ICD-10-CM

## 2023-09-07 DIAGNOSIS — Z98.1 S/P LUMBAR FUSION: Primary | ICD-10-CM

## 2023-09-07 DIAGNOSIS — Z98.1 S/P LUMBAR FUSION: ICD-10-CM

## 2023-09-07 DIAGNOSIS — M47.816 SPONDYLOSIS OF LUMBAR SPINE: ICD-10-CM

## 2023-09-07 DIAGNOSIS — M51.36 DDD (DEGENERATIVE DISC DISEASE), LUMBAR: ICD-10-CM

## 2023-09-07 DIAGNOSIS — M51.36 DDD (DEGENERATIVE DISC DISEASE), LUMBAR: Primary | ICD-10-CM

## 2023-09-07 RX ORDER — OXYCODONE HYDROCHLORIDE AND ACETAMINOPHEN 5; 325 MG/1; MG/1
1 TABLET ORAL EVERY 6 HOURS PRN
Qty: 64 TABLET | Refills: 0 | Status: SHIPPED | OUTPATIENT
Start: 2023-09-07 | End: 2023-09-21

## 2023-09-07 RX ORDER — ESCITALOPRAM OXALATE 20 MG/1
TABLET ORAL
COMMUNITY

## 2023-09-07 RX ORDER — FAMOTIDINE 20 MG/1
TABLET, FILM COATED ORAL
COMMUNITY

## 2023-09-07 RX ORDER — TIZANIDINE 2 MG/1
2 TABLET ORAL EVERY 6 HOURS PRN
COMMUNITY
Start: 2023-08-10 | End: 2023-09-07

## 2023-09-07 RX ORDER — TIZANIDINE 2 MG/1
2 TABLET ORAL EVERY 8 HOURS PRN
Qty: 200 TABLET | Refills: 0 | Status: SHIPPED | OUTPATIENT
Start: 2023-09-07

## 2023-09-07 RX ORDER — MECLIZINE HCL 12.5 MG/1
12.5 TABLET ORAL
COMMUNITY
Start: 2023-08-21

## 2023-09-07 RX ORDER — OXYCODONE HYDROCHLORIDE AND ACETAMINOPHEN 5; 325 MG/1; MG/1
1 TABLET ORAL EVERY 6 HOURS PRN
Qty: 64 TABLET | Refills: 0 | Status: SHIPPED | OUTPATIENT
Start: 2023-09-07

## 2023-09-07 RX ORDER — TIOTROPIUM BROMIDE INHALATION SPRAY 3.12 UG/1
2 SPRAY, METERED RESPIRATORY (INHALATION) DAILY
Qty: 1 G | Refills: 0 | COMMUNITY
Start: 2023-08-08 | End: 2023-09-07

## 2023-09-07 RX ORDER — BUDESONIDE AND FORMOTEROL FUMARATE DIHYDRATE 160; 4.5 UG/1; UG/1
2 AEROSOL RESPIRATORY (INHALATION)
COMMUNITY
Start: 2023-08-08 | End: 2024-06-28

## 2023-09-07 NOTE — PROGRESS NOTES
Chief complaint: 2-week follow-up after L4-L5 PLIF.    Exploration of previous L5-S1 fusion performed more than 10 years ago      Progress Summary:   Surgery performed 8/22/2023  The patient can ambulate to the commode/toilet and void independently.     History of present illness:  Mrs. Mcdowell presents for 2-week follow-up after an L4-L5 PLIF.  With exploration of previous L5-S1 fusion which was performed more than 10 years ago. Overall she is recovering well, she is able to ambulate and perform simple tasks of daily living.  She reports that most of the bilateral L5 radiculopathy that she was experiencing has resolved soon after the surgery.  She tells me that she gets up and walks around the house semifrequently, but that due to pain and tightness she does not do this for very long at a time.  She is accompanied by her  and is pushed in using a facility wheelchair.  She reports that the tizanidine and Percocet are helping to control her pain and help her to get adequate sleep and would like refills.      ROS:  A 12 point review of systems was performed.  All systems reviewed were negative with the exception of those mentioned in the history of present illness.    Past medical history:  Past Medical History:   Diagnosis Date    Anemia     Arthritis     Asthma     Bronchitis     Diabetes mellitus     Headache     Heart attack     Heart disease     History of transfusion     Hyperlipidemia     Hypertension     Low back pain     Lumbosacral disc disease     Pneumonia     Thyroid disease        Past surgical history:  Past Surgical History:   Procedure Laterality Date    BACK SURGERY      CORONARY ARTERY BYPASS GRAFT      HYSTERECTOMY  1985    INTERVENTIONAL RADIOLOGY PROCEDURE N/A 7/19/2023    Procedure: IR myelogram lumbar with fluoroscopy;  Surgeon: Aaron Cordoba MD;  Location: [facility] INVASIVE LOCATION;  Service: Interventional Radiology;  Laterality: N/A;    LUMBAR DISCECTOMY FUSION  INSTRUMENTATION N/A 8/22/2023    Procedure: posterior fusion with decompression L4-5 and exploration of prior L5-S1 fusion, use of bone morphogenic protein;  Surgeon: Honorio Jacques MD;  Location: [facility];  Service: Neurosurgery;  Laterality: N/A;    THYROIDECTOMY         Social history:  Social History     Socioeconomic History    Marital status:    Tobacco Use    Smoking status: Former     Packs/day: 1.00     Types: Cigarettes    Smokeless tobacco: Never   Substance and Sexual Activity    Alcohol use: Never    Drug use: Never    Sexual activity: Defer       Family history:  Family History   Problem Relation Age of Onset    Arthritis Mother     Cancer Mother     Heart disease Mother     Hypertension Mother     Arthritis Father     Diabetes Sister     Hypertension Sister        Allergies:  Allergies   Allergen Reactions    Carvedilol Shortness Of Breath     cough    Augmentin [Amoxicillin-Pot Clavulanate] Diarrhea    Gatifloxacin Delirium and Nausea And Vomiting    Morphine Nausea And Vomiting       Outpatient medications:  Scheduled Meds:  Continuous Infusions:No current facility-administered medications for this visit.    PRN Meds:.    Physical Examination:    Vitals:    09/07/23 0920   BP: 120/52   Temp: 97.7 °F (36.5 °C)       The Surgical site is clean and dry with no signs of erythema or infection.  The site is nontender to palpation, the skin edges are together but not yet fully epithelialized.  There is no sign of pus or drainage.    General: The patient is conversational, and is not in acute distress.   Neurological: Facial expressions are equal bilaterally.     Eyes: Extraocular eye movements are intact, and pupils are equal  Musculoskeletal: She has coordinated movements bilaterally with the upper and lower extremities with moderate strength.    Imaging:  There is no new imaging to review      Assessment and Plan:    Diagnoses and all orders for this visit:    1. S/P lumbar fusion  (Primary)  -     tiZANidine (ZANAFLEX) 2 MG tablet; Take 1 tablet by mouth Every 8 (Eight) Hours As Needed for Muscle Spasms (6mg or 4mg as needed, up to 3 times per day).  Dispense: 200 tablet; Refill: 0    2. Spondylosis of lumbar spine    3. DDD (degenerative disc disease), lumbar    4. History of back surgery    I have refilled the patient's tizanidine at 6 mg or 4 mg as needed for muscle spasm, up to 3 times per day.  - Follow-up appointment has been made for 4 weeks.  At which time physical therapy will be considered.  - I discussed red flag symptoms with the patient which would indicate the need for medical attention including signs of pus, drainage, fevers, swelling, other signs of infection at the surgical site or systemically.  I also discussed the need to be conservative during the recovery time to ensure the best chance of complete fusion.  I answered all the patient's questions to the best my ability and explained concepts as needed.      Shakeel Aguirre PA-C  09/07/23

## 2023-09-07 NOTE — TELEPHONE ENCOUNTER
I have called the patient to let her know that she can get her X-rays the day of her appointment just to come early and have them before she gets here to the office.  She was thankful for the call back.

## 2023-10-09 ENCOUNTER — HOSPITAL ENCOUNTER (OUTPATIENT)
Dept: GENERAL RADIOLOGY | Facility: HOSPITAL | Age: 68
Discharge: HOME | End: 2023-10-09
Payer: MEDICARE

## 2023-10-09 ENCOUNTER — OFFICE VISIT (OUTPATIENT)
Dept: NEUROSURGERY | Facility: CLINIC | Age: 68
End: 2023-10-09
Payer: MEDICARE

## 2023-10-09 VITALS
TEMPERATURE: 97.1 F | BODY MASS INDEX: 23.79 KG/M2 | WEIGHT: 142.8 LBS | HEIGHT: 65 IN | DIASTOLIC BLOOD PRESSURE: 52 MMHG | SYSTOLIC BLOOD PRESSURE: 112 MMHG

## 2023-10-09 DIAGNOSIS — Z98.1 S/P LUMBAR FUSION: ICD-10-CM

## 2023-10-09 DIAGNOSIS — Z98.1 STATUS POST LUMBAR SPINAL FUSION: Primary | ICD-10-CM

## 2023-10-09 DIAGNOSIS — M51.36 DDD (DEGENERATIVE DISC DISEASE), LUMBAR: ICD-10-CM

## 2023-10-09 DIAGNOSIS — Z98.890 HISTORY OF BACK SURGERY: ICD-10-CM

## 2023-10-09 DIAGNOSIS — M19.90 ARTHRITIS: ICD-10-CM

## 2023-10-09 DIAGNOSIS — M81.0 AGE-RELATED OSTEOPOROSIS WITHOUT CURRENT PATHOLOGICAL FRACTURE: ICD-10-CM

## 2023-10-09 DIAGNOSIS — G89.29 CHRONIC BILATERAL LOW BACK PAIN, UNSPECIFIED WHETHER SCIATICA PRESENT: ICD-10-CM

## 2023-10-09 DIAGNOSIS — M54.50 CHRONIC BILATERAL LOW BACK PAIN, UNSPECIFIED WHETHER SCIATICA PRESENT: ICD-10-CM

## 2023-10-09 DIAGNOSIS — M25.551 BILATERAL HIP PAIN: ICD-10-CM

## 2023-10-09 DIAGNOSIS — Z74.09 LIMITED MOBILITY: ICD-10-CM

## 2023-10-09 DIAGNOSIS — M25.552 BILATERAL HIP PAIN: ICD-10-CM

## 2023-10-09 PROCEDURE — 99024 POSTOP FOLLOW-UP VISIT: CPT

## 2023-10-09 PROCEDURE — 72100 X-RAY EXAM L-S SPINE 2/3 VWS: CPT

## 2023-10-09 RX ORDER — BUDESONIDE, GLYCOPYRROLATE, AND FORMOTEROL FUMARATE 160; 9; 4.8 UG/1; UG/1; UG/1
AEROSOL, METERED RESPIRATORY (INHALATION)
COMMUNITY
Start: 2023-09-07

## 2023-10-09 RX ORDER — TIZANIDINE 4 MG/1
4 TABLET ORAL
COMMUNITY
Start: 2023-09-28

## 2023-10-09 NOTE — PROGRESS NOTES
Chief complaint: Left leg radiculopathy, resolved        History of present illness:  This is a 68-year-old female who presents for a 6-week follow-up after an L4-5 PLIF as well as exploration of her previously performed L5-S1 fusion which was performed over 10 years ago.  She had been experiencing left-sided radiculopathy and a classic L5 pattern for a number of years.  She reports that the radicular pain and symptoms in her left leg are most completely gone.  She is experiencing bilateral hip pain which is most problematic at night when she tries to lay on either side, this appears to be consistent with trochanteric bursitis.    She continues to experience subtle weakness in dorsiflexion and plantarflexion of the left ankle.  Proximal muscle strength is similar bilaterally        ROS:  A 12 point review of systems was performed.  All systems reviewed were negative with the exception of those mentioned in the history of present illness.    Past medical history:  Past Medical History:   Diagnosis Date    Anemia     Arthritis     Asthma     Bronchitis     Diabetes mellitus     Headache     Heart attack     Heart disease     History of transfusion     Hyperlipidemia     Hypertension     Low back pain     Lumbosacral disc disease     Pneumonia     Thyroid disease        Past surgical history:  Past Surgical History:   Procedure Laterality Date    BACK SURGERY      CORONARY ARTERY BYPASS GRAFT      HYSTERECTOMY  1985    INTERVENTIONAL RADIOLOGY PROCEDURE N/A 7/19/2023    Procedure: IR myelogram lumbar with fluoroscopy;  Surgeon: Aaron Cordoba MD;  Location: [facility] CATH INVASIVE LOCATION;  Service: Interventional Radiology;  Laterality: N/A;    LUMBAR DISCECTOMY FUSION INSTRUMENTATION N/A 8/22/2023    Procedure: posterior fusion with decompression L4-5 and exploration of prior L5-S1 fusion, use of bone morphogenic protein;  Surgeon: Honorio Jacques MD;  Location: [facility] OR;  Service: Neurosurgery;   "Laterality: N/A;    THYROIDECTOMY         Social history:  Social History     Socioeconomic History    Marital status:    Tobacco Use    Smoking status: Former     Packs/day: 1     Types: Cigarettes    Smokeless tobacco: Never   Substance and Sexual Activity    Alcohol use: Never    Drug use: Never    Sexual activity: Defer       Family history:  Family History   Problem Relation Age of Onset    Arthritis Mother     Cancer Mother     Heart disease Mother     Hypertension Mother     Arthritis Father     Diabetes Sister     Hypertension Sister        Allergies:  Allergies   Allergen Reactions    Carvedilol Shortness Of Breath     cough    Augmentin [Amoxicillin-Pot Clavulanate] Diarrhea    Gatifloxacin Delirium and Nausea And Vomiting    Morphine Nausea And Vomiting       Outpatient medications:  Scheduled Meds:  Continuous Infusions:No current facility-administered medications for this visit.    PRN Meds:.    Physical Examination:    /52 (BP Location: Right arm, Patient Position: Sitting, Cuff Size: Adult)   Temp 97.1 øF (36.2 øC) (Infrared)   Ht 165.1 cm (65\")   Wt 64.8 kg (142 lb 12.8 oz)   BMI 23.76 kg/mý     Surgical site is clean, with no signs of swelling, drainage, erythema.  The skin is epithelialized along a large +/- 7 inch incision site.     General: The patient is alert, conversational.  She has her own cane which she uses to get around her home.  She presents in a hospital wheelchair which she needs for longer distance travel  Neurological: Cranial nerves II through XII are grossly intact. Facial expressions are equal bilaterally.     Eyes: Extraocular eye movements are intact, and pupils are equal, round, and reactive to light.  Musculoskeletal:   Upper extremity strength is 5/5  Lower extremity strength is 5/5 throughout.  Subtle weakness of the left side and ankle plantarflexion and dorsiflexion.  Sensation: Reported equal bilaterally        HEENT: Normocephalic, atraumatic. "   Cardiovascular: Regular rate and rhythm.  No edema noted  Pulmonary: Normal respiratory effort  Abdomen: Nontender  Psychiatric: Normal mood and affect    Imaging:  AP and lateral x-rays of the lumbar spine are available for my review.  They demonstrate well-placed hardware with no signs of Ms. place of loose hardware.      Assessment and Plan:  Diagnoses and all orders for this visit:    1. Status post lumbar spinal fusion (Primary)  -     Ambulatory Referral to Physical Therapy Evaluate and treat, POST OP    2. Arthritis    3. DDD (degenerative disc disease), lumbar  -     Ambulatory Referral to Physical Therapy Evaluate and treat, POST OP    4. Age-related osteoporosis without current pathological fracture  -     Ambulatory Referral to Physical Therapy Evaluate and treat, POST OP    5. Bilateral hip pain  -     Ambulatory Referral to Physical Therapy Evaluate and treat, POST OP    6. Chronic bilateral low back pain, unspecified whether sciatica present  -     Ambulatory Referral to Physical Therapy Evaluate and treat, POST OP      Mrs. Mcdowell is very pleased with the relief in symptoms obtained after the surgery.  She recognizes that she still has a ways to go, and part of this is related to more consistent exercise and physical therapy.  We hope that her ambulation distance and strength will improve significantly.  She has no new concerns at this time and will contact us on an as-needed basis.  She had questions about limitations that she may have and driving or performing activities around the home.  We discussed the guiding principles of this including Smart and/or pain limited limitations.  While driving she should take enough breaks to prevent developing tension and pain.  Around the home she should still avoid bending and twisting, however she can perform anything that she feels comfortable with as long as she uses good mechanics.    If her bilateral hip pain does not improve, she has been instructed to  contact her PCP for assessment of trochanteric bursitis or other cause of her hip pain/tenderness.      TONI Aguirre PA-C    10/09/23

## 2023-10-09 NOTE — PROGRESS NOTES
Vibha Mcdowell   1955   [id number]       10/09/2023     Chief Complaint   Patient presents with    Post-op        HPI   ***  Chronic Illnesses:  ***  Past Medical History:  No date: Anemia  No date: Arthritis  No date: Asthma  No date: Bronchitis  No date: Diabetes mellitus  No date: Headache  No date: Heart attack  No date: Heart disease  No date: History of transfusion  No date: Hyperlipidemia  No date: Hypertension  No date: Low back pain  No date: Lumbosacral disc disease  No date: Pneumonia  No date: Thyroid disease     Past Surgical History:   Procedure Laterality Date    BACK SURGERY      CORONARY ARTERY BYPASS GRAFT      HYSTERECTOMY  1985    INTERVENTIONAL RADIOLOGY PROCEDURE N/A 7/19/2023    Procedure: IR myelogram lumbar with fluoroscopy;  Surgeon: Aaron Cordoba MD;  Location:  [facility] CATH INVASIVE LOCATION;  Service: Interventional Radiology;  Laterality: N/A;    LUMBAR DISCECTOMY FUSION INSTRUMENTATION N/A 8/22/2023    Procedure: posterior fusion with decompression L4-5 and exploration of prior L5-S1 fusion, use of bone morphogenic protein;  Surgeon: Honorio Jacques MD;  Location:  [facility] OR;  Service: Neurosurgery;  Laterality: N/A;    THYROIDECTOMY          Allergies   Allergen Reactions    Carvedilol Shortness Of Breath     cough    Augmentin [Amoxicillin-Pot Clavulanate] Diarrhea    Gatifloxacin Delirium and Nausea And Vomiting    Morphine Nausea And Vomiting          Current Outpatient Medications:     albuterol sulfate  (90 Base) MCG/ACT inhaler, , Disp: , Rfl:     ALPRAZolam (XANAX) 0.5 MG tablet, Take 1 tablet by mouth At Night As Needed., Disp: , Rfl:     amLODIPine (NORVASC) 10 MG tablet, Take 1 tablet by mouth Daily., Disp: , Rfl:     aspirin 81 MG EC tablet, Take 1 tablet by mouth Daily., Disp: , Rfl:     atorvastatin (LIPITOR) 80 MG tablet, Take 1 tablet by mouth Every Night., Disp: , Rfl:     bisoprolol (ZEBeta) 10 MG tablet, Take 1 tablet by  mouth Daily., Disp: , Rfl:     budesonide-formoterol (SYMBICORT) 160-4.5 MCG/ACT inhaler, Inhale 2 puffs., Disp: , Rfl:     Cholecalciferol (Vitamin D3) 50 MCG (2000 UT) tablet, Take 1 tablet by mouth Every Night., Disp: , Rfl:     citalopram (CeleXA) 20 MG tablet, Take 1 tablet by mouth Daily., Disp: , Rfl:     escitalopram (LEXAPRO) 20 MG tablet, , Disp: , Rfl:     famotidine (PEPCID) 20 MG tablet, , Disp: , Rfl:     gabapentin (NEURONTIN) 400 MG capsule, Take 1 capsule by mouth 3 (Three) Times a Day., Disp: , Rfl:     Jardiance 10 MG tablet tablet, Take 1 tablet by mouth Daily., Disp: , Rfl:     lamoTRIgine (LaMICtal) 100 MG tablet, Take 1 tablet by mouth 2 (Two) Times a Day., Disp: , Rfl:     meclizine (ANTIVERT) 12.5 MG tablet, Take 1 tablet by mouth., Disp: , Rfl:     methocarbamol (ROBAXIN) 500 MG tablet, Take 1 tablet by mouth 4 (Four) Times a Day., Disp: 56 tablet, Rfl: 0    NIFEdipine XL (PROCARDIA XL) 30 MG 24 hr tablet, Take 1 tablet by mouth Daily., Disp: , Rfl:     omeprazole (priLOSEC) 20 MG capsule, Take 1 capsule by mouth Daily., Disp: , Rfl:     oxyCODONE-acetaminophen (Percocet) 5-325 MG per tablet, Take 1 tablet by mouth Every 6 (Six) Hours As Needed for Moderate Pain., Disp: 64 tablet, Rfl: 0    tiotropium bromide monohydrate (Spiriva Respimat) 2.5 MCG/ACT aerosol solution inhaler, Inhale 2 puffs Daily., Disp: 1 g, Rfl: 0    tiZANidine (ZANAFLEX) 2 MG tablet, Take 1 tablet by mouth Every 8 (Eight) Hours As Needed for Muscle Spasms (6mg or 4mg as needed, up to 3 times per day)., Disp: 200 tablet, Rfl: 0    valsartan-hydrochlorothiazide (DIOVAN-HCT) 320-25 MG per tablet, Take 1 tablet by mouth Daily., Disp: , Rfl:      Social History     Socioeconomic History    Marital status:    Tobacco Use    Smoking status: Former     Packs/day: 1     Types: Cigarettes    Smokeless tobacco: Never   Substance and Sexual Activity    Alcohol use: Never    Drug use: Never    Sexual  "activity: Defer        family history includes Arthritis in her father and mother; Cancer in her mother; Diabetes in her sister; Heart disease in her mother; Hypertension in her mother and sister.     Review of Systems   Genitourinary:  Negative for flank pain.        Gait & Balance Assessment :  Risk assessment for falls. Fall precautions.  universal fall precautions, such as;   Using gait aids a cane, walker at the appropriate height at all times for ambulation or if necessary a wheelchair  Removing all area rugs and coffee tables to create a safe environment at home  Ensure clean, dry floors  Wearing supportive footwear and properly fitting clothing  Ensure bed/chair is appropriate height and patient's feet can touch the floor  Using a shower transfer bench  Using walk-in shower and having shower safety bars installed  Ensure proper lighting, minimize glare  Have nightlights operational and in use  Participation in an exercise program for gait training, balance training and strength  Avoid carrying laundry up and down steps  Ensure proper compliance and organization of medications to avoid errors   Avoid use of over the counter sedatives and alcohol consumption  Ensure easy access to call bell, glasses, TV control, telephone  Ensure glasses/hearing aids are in use or close by (on top of night table)     Social History    Tobacco Use      Smoking status: Former        Packs/day: 1        Types: Cigarettes      Smokeless tobacco: Never      Body mass index is 23.96 kg/mý.   BMI is within normal parameters. No other follow-up for BMI required.       Physical Examination:  Ht 165.1 cm (65\")   BMI 23.96 kg/mý    HEENT-wnl  Lungs-No wheezing or SOB  Patient is awake, alert and oriented.  Pupils 3 mm, equal and reactive.  Visual fields are full.  EOMI without nystagmus.  Normal facial sensation to light touch in all 3 distributions of CN V bilaterally.  Face symmetric.  Tongue midline..  5/5 in the " extremities.  Sensation intact.  Reflexes intact.  Gait ***  SLR***  Hip rotation ***    Radiological Data Review:  All neurological imaging studies were independently reviewed unless otherwise documented.    Assessment and Plan:  There are no diagnoses linked to this encounter.     Including assessment, review of prior documentation, review and interpretation of new and old diagnostic studies, discussing these findings with the patient and documentation, 30 minutes total time was spent on this appointment.    Any copied data from previous notes included in the (1) HPI, (2) PE, (3) MDM and/or assessment and plan has been reviewed and is accurate as of this date.    Christy Coleman, PAC    PCP:  Irene Donaldson APRN

## (undated) DEVICE — SPHR MARKR STEALTH STATION

## (undated) DEVICE — ANTIBACTERIAL UNDYED BRAIDED (POLYGLACTIN 910), SYNTHETIC ABSORBABLE SUTURE: Brand: COATED VICRYL

## (undated) DEVICE — TRY L/P SFTY A/20G

## (undated) DEVICE — SUT VICYL 2/0 CR8 18IN DYED J726D

## (undated) DEVICE — HDRST INTUB GENTLETOUCH SLOT 7IN RT

## (undated) DEVICE — TB SXN FRAZIER 12F STRL

## (undated) DEVICE — GLV SURG BIOGEL LTX PF 8

## (undated) DEVICE — TOOL MR8-14MH30 MR8 14CM MATCH 3MM: Brand: MIDAS REX MR8

## (undated) DEVICE — GOWN,NON-REINFORCED,SIRUS,SET IN SLV,XXL: Brand: MEDLINE

## (undated) DEVICE — NDL HYPO ECLPS SFTY 25G 1 1/2IN

## (undated) DEVICE — BLANKT WARM UPPR/BDY ARM/OUT 57X196CM

## (undated) DEVICE — UNDERGLV SURG BIOGEL INDICAT PI SZ8.5 BLU

## (undated) DEVICE — PCH INST SURG INVISISHIELD 2PCKT

## (undated) DEVICE — TOOL MR8-14BA60 MR8 14CM BALL 6MM: Brand: MIDAS REX MR8

## (undated) DEVICE — JACKSON-PRATT 100CC BULB RESERVOIR: Brand: CARDINAL HEALTH

## (undated) DEVICE — DISPOSABLE BIPOLAR FORCEPS 7 3/4" (19.7CM) SCOVILLE BAYONET, INSULATED, 1.5MM TIP AND 12 FT. (3.6M) CABLE: Brand: KIRWAN

## (undated) DEVICE — 3M™ MEDIPORE™ H SOFT CLOTH SURGICAL TAPE, 2863, 3 IN X 10 YD, 12/CASE: Brand: 3M™ MEDIPORE™

## (undated) DEVICE — TRAP,MUCUS SPECIMEN,40CC: Brand: MEDLINE

## (undated) DEVICE — PK NEURO DISC 10

## (undated) DEVICE — SNAP KOVER: Brand: UNBRANDED

## (undated) DEVICE — BIPOLAR SEALER 23-112-1 AQM 6.0: Brand: AQUAMANTYS™

## (undated) DEVICE — TB SXN FRAZIER 10F STRL

## (undated) DEVICE — STRAP POSTN KN/BDY FM 5X72IN DISP

## (undated) DEVICE — Device

## (undated) DEVICE — PACK,UNIVERSAL,NO GOWNS: Brand: MEDLINE

## (undated) DEVICE — SPNG LAP PREWSH SFTPK 18X18IN STRL PK/5

## (undated) DEVICE — DRP CLEARVIEWTEARAWAY O/ARM

## (undated) DEVICE — JP PERF DRN SIL FLT 10MM FULL: Brand: CARDINAL HEALTH

## (undated) DEVICE — TBG PENCL TELESCP MEGADYNE SMOKE EVAC 10FT

## (undated) DEVICE — APPL CHLORAPREP TINTED 26ML TEAL

## (undated) DEVICE — SYR CONTRL PRESS/LO FIX/M/LL W/THMB/RNG 10ML

## (undated) DEVICE — NEEDLE, QUINCKE 22GX3.5": Brand: MEDLINE INDUSTRIES, INC.

## (undated) DEVICE — SPNG GZ WOVN 4X4IN 12PLY 10/BX STRL

## (undated) DEVICE — NDL SPINE 22G 31/2IN BLK

## (undated) DEVICE — ADHS SKIN PREMIERPRO EXOFIN TOPICAL HI/VISC .5ML